# Patient Record
Sex: FEMALE | Race: WHITE | Employment: OTHER | URBAN - METROPOLITAN AREA
[De-identification: names, ages, dates, MRNs, and addresses within clinical notes are randomized per-mention and may not be internally consistent; named-entity substitution may affect disease eponyms.]

---

## 2017-01-25 NOTE — TELEPHONE ENCOUNTER
Metoprolol      Last Written Prescription Date: 9/27/2016  Last Fill Quantity: 30, # refills: 1  Last Office Visit with G, P or [facility] prescribing provider: 5/27/2016       POTASSIUM   Date Value Ref Range Status   05/24/2016 3.4 3.4 - 5.3 mmol/L Final     CREATININE   Date Value Ref Range Status   05/24/2016 0.63 0.52 - 1.04 mg/dL Final     BP Readings from Last 3 Encounters:   06/15/16 126/70   06/08/16 172/68   06/01/16 150/70

## 2017-01-26 RX ORDER — METOPROLOL SUCCINATE 50 MG/1
50 TABLET, EXTENDED RELEASE ORAL DAILY
Qty: 30 TABLET | Refills: 4 | Status: SHIPPED | OUTPATIENT
Start: 2017-01-26 | End: 2017-11-21

## 2017-01-26 NOTE — TELEPHONE ENCOUNTER
Prescription approved per [facility] Refill Protocol.    Anny Sandoval RN

## 2017-08-03 DIAGNOSIS — K21.9 GASTROESOPHAGEAL REFLUX DISEASE WITHOUT ESOPHAGITIS: ICD-10-CM

## 2017-08-03 NOTE — TELEPHONE ENCOUNTER
omeprazole      Last Written Prescription Date: 6/22/16  Last Fill Quantity: 30,  # refills: 11   Last Office Visit with G, P or [facility] prescribing provider: 5/27/16

## 2017-08-03 NOTE — TELEPHONE ENCOUNTER
Routing refill request to provider for review/approval because:  A break in medication  Patient needs to be seen because it has been more than 1 year since last office visit.  Gregoria Josue RN

## 2017-11-21 ENCOUNTER — OFFICE VISIT (OUTPATIENT)
Dept: INTERNAL MEDICINE | Facility: CLINIC | Age: 72
End: 2017-11-21
Payer: COMMERCIAL

## 2017-11-21 VITALS
HEART RATE: 92 BPM | HEIGHT: 62 IN | RESPIRATION RATE: 16 BRPM | OXYGEN SATURATION: 98 % | DIASTOLIC BLOOD PRESSURE: 64 MMHG | WEIGHT: 147 LBS | BODY MASS INDEX: 27.05 KG/M2 | TEMPERATURE: 98.2 F | SYSTOLIC BLOOD PRESSURE: 127 MMHG

## 2017-11-21 DIAGNOSIS — K21.9 GASTROESOPHAGEAL REFLUX DISEASE WITHOUT ESOPHAGITIS: ICD-10-CM

## 2017-11-21 DIAGNOSIS — G89.29 CHRONIC PAIN OF BOTH KNEES: Primary | ICD-10-CM

## 2017-11-21 DIAGNOSIS — M25.561 CHRONIC PAIN OF BOTH KNEES: Primary | ICD-10-CM

## 2017-11-21 DIAGNOSIS — Z23 NEED FOR PNEUMOCOCCAL VACCINATION: ICD-10-CM

## 2017-11-21 DIAGNOSIS — R03.0 ELEVATED BLOOD PRESSURE READING WITHOUT DIAGNOSIS OF HYPERTENSION: ICD-10-CM

## 2017-11-21 DIAGNOSIS — E78.2 MIXED HYPERLIPIDEMIA: ICD-10-CM

## 2017-11-21 DIAGNOSIS — M25.562 CHRONIC PAIN OF BOTH KNEES: Primary | ICD-10-CM

## 2017-11-21 DIAGNOSIS — S83.419A SPRAIN OF MEDIAL COLLATERAL LIGAMENT OF KNEE, UNSPECIFIED LATERALITY, INITIAL ENCOUNTER: ICD-10-CM

## 2017-11-21 PROCEDURE — 90670 PCV13 VACCINE IM: CPT | Performed by: INTERNAL MEDICINE

## 2017-11-21 PROCEDURE — 99214 OFFICE O/P EST MOD 30 MIN: CPT | Mod: 25 | Performed by: INTERNAL MEDICINE

## 2017-11-21 PROCEDURE — G0009 ADMIN PNEUMOCOCCAL VACCINE: HCPCS | Performed by: INTERNAL MEDICINE

## 2017-11-21 RX ORDER — METOPROLOL SUCCINATE 50 MG/1
50 TABLET, EXTENDED RELEASE ORAL DAILY
Qty: 90 TABLET | Refills: 3 | Status: SHIPPED | OUTPATIENT
Start: 2017-11-21 | End: 2018-12-12

## 2017-11-21 RX ORDER — ATORVASTATIN CALCIUM 20 MG/1
20 TABLET, FILM COATED ORAL DAILY
Qty: 90 TABLET | Refills: 3 | Status: SHIPPED | OUTPATIENT
Start: 2017-11-21 | End: 2018-11-07

## 2017-11-21 ASSESSMENT — PAIN SCALES - GENERAL: PAINLEVEL: NO PAIN (0)

## 2017-11-21 NOTE — NURSING NOTE
"Chief Complaint   Patient presents with     Leg Pain     bilateral leg pain, started in the summer       Initial /84  Pulse 92  Temp 98.2  F (36.8  C) (Temporal)  Resp 16  Ht 5' 2\" (1.575 m)  Wt 147 lb (66.7 kg)  SpO2 98%  BMI 26.89 kg/m2 Estimated body mass index is 26.89 kg/(m^2) as calculated from the following:    Height as of this encounter: 5' 2\" (1.575 m).    Weight as of this encounter: 147 lb (66.7 kg).  Medication Reconciliation: complete    "

## 2017-11-21 NOTE — PROGRESS NOTES
"  SUBJECTIVE:   Jordyn Craig is a 72 year old female who presents to clinic today for the following health issues:    Chief Complaint   Patient presents with     Leg Pain     bilateral leg pain, started in the summer     Started in her left leg which is her \"bad leg\" from old injury.  Sore in the medial knees, worse with sitting too long.  Some pain down the back of her knees.  Also pain with doing stairs.      Stretching and walking has helped her pains.  No known injury.  Tried alleve, tylenol, heat, no real difference.      BP is ok at home was 127/64    Past Medical History:   Diagnosis Date     Anxiety      Migraine      Current Outpatient Prescriptions   Medication     omeprazole (PRILOSEC) 20 MG CR capsule     metoprolol (TOPROL-XL) 50 MG 24 hr tablet     acetaminophen (TYLENOL 8 HOUR) 650 MG CR tablet     aspirin 81 MG tablet     No current facility-administered medications for this visit.      Social History   Substance Use Topics     Smoking status: Never Smoker     Smokeless tobacco: Not on file     Alcohol use No     Review of Systems  Constitutional-No fevers, chills, or weight changes..  Cardiac-No chest pain or palpitations.  Respiratory-No cough, sob, or hemoptysis.  GI-reflux without the omeprazole.  Musculoskeletal-bilateral inner knee pain.      Physical Exam  /84  Pulse 92  Temp 98.2  F (36.8  C) (Temporal)  Resp 16  Ht 5' 2\" (1.575 m)  Wt 147 lb (66.7 kg)  SpO2 98%  BMI 26.89 kg/m2  General Appearance-healthy, alert, no distress  Cardiac-regular rate and rhythm  with normal S1, S2 ; no murmur, rub or gallops  Lungs-clear to auscultation  Musculoskeletal-bilateral knees are without swelling, no clicking, no jointline tenderness  Mild pain on MCL testing.    ASSESSMENT:  Patient is here for bilateral knee pain, pain is on the medial aspect of the knee worse with doing stairs, walking. Patient slightly better with stretching and exercise. We discussed exercises to help the medial " aspect of the quadriceps. Recommended an elliptical and specifically leg lifts. She will follow up if not getting better.    Hyperlipidemia-patient has been off her statin and she wasn't sure she needed to continue it, did recommend she stay on the statin therapy and we have refilled this for her.    GERD-intact. Side effects of a proton pump inhibitor and she will try a H2 blocker instead.    Elevated blood pressure-patient does well at home with blood pressure 127/68. She has some whitecoat hypertension. We will continue her on the metoprolol.    Electronically signed by Joe Geller MD

## 2017-11-21 NOTE — PROGRESS NOTES
Screening Questionnaire for Adult Immunization    Are you sick today?   No   Do you have allergies to medications, food, a vaccine component or latex?   Yes   Have you ever had a serious reaction after receiving a vaccination?   No   Do you have a long-term health problem with heart disease, lung disease, asthma, kidney disease, metabolic disease (e.g. diabetes), anemia, or other blood disorder?   No   Do you have cancer, leukemia, HIV/AIDS, or any other immune system problem?   No   In the past 3 months, have you taken medications that affect  your immune system, such as prednisone, other steroids, or anticancer drugs; drugs for the treatment of rheumatoid arthritis, Crohn s disease, or psoriasis; or have you had radiation treatments?   No   Have you had a seizure, or a brain or other nervous system problem?   No   During the past year, have you received a transfusion of blood or blood     products, or been given immune (gamma) globulin or antiviral drug?   No   For women: Are you pregnant or is there a chance you could become        pregnant during the next month?   No   Have you received any vaccinations in the past 4 weeks?   No     Immunization questionnaire was positive for at least one answer.  Notified yes.        Per orders of Dr. Joe Geller, injection of prevnar given by Mariangel Corrigan. Patient instructed to remain in clinic for 15 minutes afterwards, and to report any adverse reaction to me immediately.       Screening performed by Mariangel Corrigan on 11/21/2017 at 9:40 AM.

## 2017-11-21 NOTE — MR AVS SNAPSHOT
"              After Visit Summary   2017    Jordyn Craig    MRN: [id number]           Patient Information     Date Of Birth          1945        Visit Information        Provider Department      2017 9:00 AM Joe Geller MD [facility]         Follow-ups after your visit        Who to contact     If you have questions or need follow up information about today's clinic visit or your schedule please contact [facility] directly at [phone].  Normal or non-critical lab and imaging results will be communicated to you by MyChart, letter or phone within 4 business days after the clinic has received the results. If you do not hear from us within 7 days, please contact the clinic through [facility] or phone. If you have a critical or abnormal lab result, we will notify you by phone as soon as possible.  Submit refill requests through [facility] or call your pharmacy and they will forward the refill request to us. Please allow 3 business days for your refill to be completed.          Additional Information About Your Visit        [facility] Information     [facility] lets you send messages to your doctor, view your test results, renew your prescriptions, schedule appointments and more. To sign up, go to [email or web address] . Click on \"Log in\" on the left side of the screen, which will take you to the Welcome page. Then click on \"Sign up Now\" on the right side of the page.     You will be asked to enter the access code listed below, as well as some personal information. Please follow the directions to create your username and password.     Your access code is: [email or web address]  Expires: 2018  9:08 AM     Your access code will  in 90 days. If you need help or a new code, please call your [facility] or [phone].        Care EveryWhere ID     This is your Care EveryWhere ID. This could be used by other organizations to access your [place] medical " "records  [id number]        Your Vitals Were     Pulse Temperature Respirations Height Pulse Oximetry BMI (Body Mass Index)    92 98.2  F (36.8  C) (Temporal) 16 5' 2\" (1.575 m) 98% 26.89 kg/m2       Blood Pressure from Last 3 Encounters:   11/21/17 164/84   06/15/16 126/70   06/08/16 172/68    Weight from Last 3 Encounters:   11/21/17 147 lb (66.7 kg)   05/27/16 144 lb (65.3 kg)   05/24/16 150 lb (68 kg)              Today, you had the following     No orders found for display         Today's Medication Changes          These changes are accurate as of: 11/21/17  9:08 AM.  If you have any questions, ask your nurse or doctor.               Stop taking these medicines if you haven't already. Please contact your care team if you have questions.     ADVIL MIGRAINE 200 MG capsule   Generic drug:  ibuprofen   Stopped by:  Joe Geller MD                    Primary Care Provider Office Phone # Fax #    Joe Geller [phone]       [facility] [address] [place] [address]        Equal Access to Services     [facility] AH: Hadii aad ku hadasho Soomaali, waaxda luqadaha, qaybta kaalmada adeegyada, waxay marcyin hayaan genna mcgarry lajessen ah. So [facility] [phone].    ATENCIÓN: Si habla español, tiene a ott disposición servicios gratuitos de asistencia lingüística. Llame al [phone].    We comply with applicable federal civil rights laws and [place] laws. We do not discriminate on the basis of race, color, national origin, age, disability, sex, sexual orientation, or gender identity.            Thank you!     Thank you for choosing [facility]  for your care. Our goal is always to provide you with excellent care. Hearing back from our patients is one way we can continue to improve our services. Please take a few minutes to complete the written survey that you may receive in the mail after your visit with us. Thank you!             Your Updated Medication List - " Protect others around you: Learn how to safely use, store and throw away your medicines at www.disposemymeds.org.          This list is accurate as of: 11/21/17  9:08 AM.  Always use your most recent med list.                   Brand Name Dispense Instructions for use Diagnosis    aspirin 81 MG tablet      Take  by mouth daily.        metoprolol 50 MG 24 hr tablet    TOPROL-XL    30 tablet    Take 1 tablet (50 mg) by mouth daily    Elevated blood pressure       omeprazole 20 MG CR capsule    priLOSEC    30 capsule    Take 1 capsule (20 mg) by mouth every evening    Gastroesophageal reflux disease without esophagitis       TYLENOL 8 HOUR 650 MG CR tablet   Generic drug:  acetaminophen     60 tablet    Take 2 tablets (1,300 mg) by mouth every 8 hours as needed For pain

## 2018-07-31 ENCOUNTER — TRANSFERRED RECORDS (OUTPATIENT)
Dept: HEALTH INFORMATION MANAGEMENT | Facility: CLINIC | Age: 73
End: 2018-07-31

## 2018-11-07 DIAGNOSIS — K21.9 GASTROESOPHAGEAL REFLUX DISEASE WITHOUT ESOPHAGITIS: ICD-10-CM

## 2018-11-07 DIAGNOSIS — E78.2 MIXED HYPERLIPIDEMIA: ICD-10-CM

## 2018-11-09 RX ORDER — ATORVASTATIN CALCIUM 20 MG/1
TABLET, FILM COATED ORAL
Qty: 30 TABLET | Refills: 0 | Status: SHIPPED | OUTPATIENT
Start: 2018-11-09 | End: 2018-12-12

## 2018-11-09 NOTE — TELEPHONE ENCOUNTER
"ranitide  Last Written Prescription Date:  11/21/2017  Last Fill Quantity: 180,  # refills: 3   Last office visit: 11/21/2017 with prescribing provider:      Future Office Visit:      Requested Prescriptions   Pending Prescriptions Disp Refills     ranitidine (ZANTAC) 150 MG tablet [Pharmacy Med Name: RANITIDINE HCL 150MG TABS] 180 tablet 2     Sig: TAKE ONE TABLET BY MOUTH TWICE A DAY    H2 Blockers Protocol Passed    11/7/2018  1:33 PM       Passed - Patient is age 12 or older       Passed - Recent (12 mo) or future (30 days) visit within the authorizing provider's specialty    Patient had office visit in the last 12 months or has a visit in the next 30 days with authorizing provider or within the authorizing provider's specialty.  See \"Patient Info\" tab in inbasket, or \"Choose Columns\" in Meds & Orders section of the refill encounter.         Medication is being filled for 1 time refill only due to:  Patient needs to be seen because it has been more than one year since last visit.  Sending to scheduling for outreach.          Atorvastatin  Last Written Prescription Date:  11/21/2017  Last Fill Quantity: 90,  # refills: 3   Last office visit: 11/21/2017 with prescribing provider:      Future Office Visit:         atorvastatin (LIPITOR) 20 MG tablet [Pharmacy Med Name: ATORVASTATIN 20MG TABS] 90 tablet 2     Sig: TAKE ONE TABLET BY MOUTH ONCE DAILY    Statins Protocol Failed    11/7/2018  1:33 PM       Failed - LDL on file in past 12 months    Recent Labs   Lab Test  11/04/16   0756   LDL  165*            Passed - No abnormal creatine kinase in past 12 months    No lab results found.            Passed - Recent (12 mo) or future (30 days) visit within the authorizing provider's specialty    Patient had office visit in the last 12 months or has a visit in the next 30 days with authorizing provider or within the authorizing provider's specialty.  See \"Patient Info\" tab in inbasket, or \"Choose Columns\" in Meds & Orders " section of the refill encounter.             Passed - Patient is age 18 or older       Passed - No active pregnancy on record       Passed - No positive pregnancy test in past 12 months          Medication is being filled for 1 time refill only due to:  Patient needs to be seen because it has been more than one year since last visit. Sending to scheduling for outreach-NEEDS FASTING LABS        Tanika Corrigan RN on 11/9/2018 at 2:40 PM

## 2018-12-12 ENCOUNTER — OFFICE VISIT (OUTPATIENT)
Dept: INTERNAL MEDICINE | Facility: CLINIC | Age: 73
End: 2018-12-12
Payer: COMMERCIAL

## 2018-12-12 VITALS
TEMPERATURE: 98.7 F | WEIGHT: 145 LBS | SYSTOLIC BLOOD PRESSURE: 130 MMHG | OXYGEN SATURATION: 98 % | HEIGHT: 62 IN | RESPIRATION RATE: 16 BRPM | HEART RATE: 84 BPM | BODY MASS INDEX: 26.68 KG/M2 | DIASTOLIC BLOOD PRESSURE: 68 MMHG

## 2018-12-12 DIAGNOSIS — Z12.11 SPECIAL SCREENING FOR MALIGNANT NEOPLASMS, COLON: ICD-10-CM

## 2018-12-12 DIAGNOSIS — R03.0 ELEVATED BLOOD PRESSURE READING WITHOUT DIAGNOSIS OF HYPERTENSION: ICD-10-CM

## 2018-12-12 DIAGNOSIS — E78.2 MIXED HYPERLIPIDEMIA: Primary | ICD-10-CM

## 2018-12-12 DIAGNOSIS — K21.9 GASTROESOPHAGEAL REFLUX DISEASE WITHOUT ESOPHAGITIS: ICD-10-CM

## 2018-12-12 DIAGNOSIS — Z23 NEED FOR PNEUMOCOCCAL VACCINATION: ICD-10-CM

## 2018-12-12 LAB
ALBUMIN SERPL-MCNC: 4.3 G/DL (ref 3.4–5)
ALP SERPL-CCNC: 73 U/L (ref 40–150)
ALT SERPL W P-5'-P-CCNC: 25 U/L (ref 0–50)
ANION GAP SERPL CALCULATED.3IONS-SCNC: 8 MMOL/L (ref 3–14)
AST SERPL W P-5'-P-CCNC: 17 U/L (ref 0–45)
BILIRUB SERPL-MCNC: 0.8 MG/DL (ref 0.2–1.3)
BUN SERPL-MCNC: 7 MG/DL (ref 7–30)
CALCIUM SERPL-MCNC: 9.6 MG/DL (ref 8.5–10.1)
CHLORIDE SERPL-SCNC: 105 MMOL/L (ref 94–109)
CHOLEST SERPL-MCNC: 183 MG/DL
CO2 SERPL-SCNC: 26 MMOL/L (ref 20–32)
CREAT SERPL-MCNC: 0.66 MG/DL (ref 0.52–1.04)
GFR SERPL CREATININE-BSD FRML MDRD: 87 ML/MIN/1.7M2
GLUCOSE SERPL-MCNC: 110 MG/DL (ref 70–99)
HDLC SERPL-MCNC: 71 MG/DL
LDLC SERPL CALC-MCNC: 93 MG/DL
NONHDLC SERPL-MCNC: 112 MG/DL
POTASSIUM SERPL-SCNC: 4.1 MMOL/L (ref 3.4–5.3)
PROT SERPL-MCNC: 7.8 G/DL (ref 6.8–8.8)
SODIUM SERPL-SCNC: 139 MMOL/L (ref 133–144)
TRIGL SERPL-MCNC: 94 MG/DL

## 2018-12-12 PROCEDURE — 99214 OFFICE O/P EST MOD 30 MIN: CPT | Performed by: INTERNAL MEDICINE

## 2018-12-12 PROCEDURE — G0009 ADMIN PNEUMOCOCCAL VACCINE: HCPCS | Performed by: INTERNAL MEDICINE

## 2018-12-12 PROCEDURE — 80053 COMPREHEN METABOLIC PANEL: CPT | Performed by: INTERNAL MEDICINE

## 2018-12-12 PROCEDURE — 36415 COLL VENOUS BLD VENIPUNCTURE: CPT | Performed by: INTERNAL MEDICINE

## 2018-12-12 PROCEDURE — 80061 LIPID PANEL: CPT | Performed by: INTERNAL MEDICINE

## 2018-12-12 PROCEDURE — 90732 PPSV23 VACC 2 YRS+ SUBQ/IM: CPT | Performed by: INTERNAL MEDICINE

## 2018-12-12 RX ORDER — ATORVASTATIN CALCIUM 20 MG/1
20 TABLET, FILM COATED ORAL DAILY
Qty: 90 TABLET | Refills: 3 | Status: SHIPPED | OUTPATIENT
Start: 2018-12-12 | End: 2019-12-18

## 2018-12-12 RX ORDER — METOPROLOL SUCCINATE 50 MG/1
50 TABLET, EXTENDED RELEASE ORAL DAILY
Qty: 90 TABLET | Refills: 3 | Status: SHIPPED | OUTPATIENT
Start: 2018-12-12 | End: 2020-01-14

## 2018-12-12 ASSESSMENT — PAIN SCALES - GENERAL: PAINLEVEL: NO PAIN (0)

## 2018-12-12 ASSESSMENT — MIFFLIN-ST. JEOR: SCORE: 1115.97

## 2018-12-12 NOTE — NURSING NOTE
Prior to injection, verified patient identity using patient's name and date of birth.  Due to injection administration, patient instructed to remain in clinic for 15 minutes  afterwards, and to report any adverse reaction to me immediately.

## 2018-12-12 NOTE — PROGRESS NOTES
"Jordyn Craig is a 73 year old female who presents with recheck of things.  She is doing well.      On ranitidine still needs antacids, was on PPI before.        Past Medical History:   Diagnosis Date     Anxiety      Migraine      White coat syndrome with high blood pressure without hypertension      Current Outpatient Medications   Medication     acetaminophen (TYLENOL 8 HOUR) 650 MG CR tablet     aspirin 81 MG tablet     atorvastatin (LIPITOR) 20 MG tablet     metoprolol (TOPROL-XL) 50 MG 24 hr tablet     ranitidine (ZANTAC) 150 MG tablet     No current facility-administered medications for this visit.      Social History     Tobacco Use     Smoking status: Never Smoker     Smokeless tobacco: Never Used   Substance Use Topics     Alcohol use: No     Alcohol/week: 0.0 oz     Drug use: No     Review of Systems  Constitutional-No fevers, chills, or weight changes..  ENT-No earpain, sore throat, voice changes or rhinitis.  Cardiac-No chest pain or palpitations.  Respiratory-No cough, sob, or hemoptysis.  GI-No nausea, vomitting, diarrhea, constipation, or blood in the stool.    Physical Exam  /68   Pulse 84   Temp 98.7  F (37.1  C) (Temporal)   Resp 16   Ht 1.575 m (5' 2\")   Wt 65.8 kg (145 lb)   SpO2 98%   BMI 26.52 kg/m    General Appearance-healthy, alert, no distress  Cardiac-regular rate and rhythm  with normal S1, S2 ; no murmur, rub or gallops  Lungs-clear to auscultation  Extremities-no peripheral edema, peripheral pulses normal    ASSESSMENT:  This is a 73-year-old woman who comes in for a yearly recheck, she does have hyperlipidemia and is on atorvastatin.  She is fasting today we will check a fasting lipid panel and her liver function test today.    Patient also has slightly elevated blood pressures on Toprol and blood pressure is 130/68 will refill her Toprol for the next year.    Gastroesophageal reflux disease we discussed her symptoms she still taking antacids.  We will change her from " ranitidine back to a proton pump inhibitor and hopefully get better coverage.    Recommended she do a fit test and order that today, also recommend a mammogram which she says she will schedule.  We will give her a pneumonia shot today.    Electronically signed by Joe Geller MD

## 2019-01-13 PROCEDURE — 82274 ASSAY TEST FOR BLOOD FECAL: CPT | Performed by: INTERNAL MEDICINE

## 2019-01-16 ENCOUNTER — TELEPHONE (OUTPATIENT)
Dept: INTERNAL MEDICINE | Facility: CLINIC | Age: 74
End: 2019-01-16

## 2019-01-16 DIAGNOSIS — Z12.11 SPECIAL SCREENING FOR MALIGNANT NEOPLASMS, COLON: ICD-10-CM

## 2019-01-16 LAB — HEMOCCULT STL QL IA: NEGATIVE

## 2019-01-16 NOTE — TELEPHONE ENCOUNTER
----- Message from Joe Geller MD sent at 1/16/2019 12:46 PM CST -----  Stool test was negative which is good.

## 2019-01-16 NOTE — LETTER
January 17, 2019      Jordyn TELLES Rafa  [address]  [place] [address]        Dear ,    We are writing to inform you of your test results.    Stool test was negative which is good.    Resulted Orders   Fecal colorectal cancer screen (FIT)   Result Value Ref Range    Occult Blood Scn FIT Negative NEG^Negative       If you have any questions or concerns, please call the clinic at the number listed above.       Sincerely,        Joe Geller MD

## 2019-03-12 ENCOUNTER — TELEPHONE (OUTPATIENT)
Dept: INTERNAL MEDICINE | Facility: CLINIC | Age: 74
End: 2019-03-12

## 2019-03-12 DIAGNOSIS — K21.9 GASTROESOPHAGEAL REFLUX DISEASE WITHOUT ESOPHAGITIS: Primary | ICD-10-CM

## 2019-03-12 RX ORDER — PANTOPRAZOLE SODIUM 40 MG/1
40 TABLET, DELAYED RELEASE ORAL DAILY
Qty: 30 TABLET | Refills: 1 | Status: SHIPPED | OUTPATIENT
Start: 2019-03-12 | End: 2020-01-14

## 2019-03-12 NOTE — TELEPHONE ENCOUNTER
Pt was advised of the recommendations, pt verbalized understanding. Rx sent already and pt was scheduled.

## 2019-03-12 NOTE — TELEPHONE ENCOUNTER
Reason for Call:  Other call back    Detailed comments: Pt stating the med for reflex,omeprazole (PRILOSEC) 20 MG DR capsule  is not working anymore. The reflex is bad and is constant, wondering if you can prescribe something else for her? Please call and advise.     Phone Number Patient can be reached at: Home number on file [phone] (home)    Best Time: anytime    Can we leave a detailed message on this number? YES    Call taken on 3/12/2019 at 10:02 AM by Staci Davis

## 2019-03-19 NOTE — TELEPHONE ENCOUNTER
Patient is calling back. She did try the Protonix and it is not helping. Her reflux has gotten much worse. She is having a lot of pain. Would like to get worked in sooner then her upcoming appointment in April. Can she be worked in sometime this week?  Thank you,  Adriana Green   for [facility]

## 2019-03-22 ENCOUNTER — OFFICE VISIT (OUTPATIENT)
Dept: FAMILY MEDICINE | Facility: CLINIC | Age: 74
End: 2019-03-22
Payer: COMMERCIAL

## 2019-03-22 VITALS
RESPIRATION RATE: 14 BRPM | HEIGHT: 62 IN | DIASTOLIC BLOOD PRESSURE: 74 MMHG | SYSTOLIC BLOOD PRESSURE: 136 MMHG | TEMPERATURE: 97.3 F | HEART RATE: 75 BPM | WEIGHT: 141.1 LBS | BODY MASS INDEX: 25.96 KG/M2 | OXYGEN SATURATION: 98 %

## 2019-03-22 DIAGNOSIS — K21.9 GASTROESOPHAGEAL REFLUX DISEASE WITHOUT ESOPHAGITIS: ICD-10-CM

## 2019-03-22 DIAGNOSIS — Z12.39 SCREENING BREAST EXAMINATION: ICD-10-CM

## 2019-03-22 DIAGNOSIS — R09.A2 GLOBUS SENSATION: Primary | ICD-10-CM

## 2019-03-22 LAB — TSH SERPL DL<=0.005 MIU/L-ACNC: 1.65 MU/L (ref 0.4–4)

## 2019-03-22 PROCEDURE — 84443 ASSAY THYROID STIM HORMONE: CPT | Performed by: FAMILY MEDICINE

## 2019-03-22 PROCEDURE — 36415 COLL VENOUS BLD VENIPUNCTURE: CPT | Performed by: FAMILY MEDICINE

## 2019-03-22 PROCEDURE — 99214 OFFICE O/P EST MOD 30 MIN: CPT | Performed by: FAMILY MEDICINE

## 2019-03-22 RX ORDER — ALPRAZOLAM 0.5 MG
0.5 TABLET ORAL
Qty: 20 TABLET | Refills: 0 | Status: SHIPPED | OUTPATIENT
Start: 2019-03-22 | End: 2020-01-14

## 2019-03-22 RX ORDER — METHYLPREDNISOLONE 4 MG
TABLET, DOSE PACK ORAL
Qty: 21 TABLET | Refills: 0 | Status: SHIPPED | OUTPATIENT
Start: 2019-03-22 | End: 2019-03-27

## 2019-03-22 ASSESSMENT — MIFFLIN-ST. JEOR: SCORE: 1104.66

## 2019-03-22 NOTE — LETTER
March 22, 2019      Jordyn Craig  [address]  [place] [address]        Dear ,    We are writing to inform you of your test results.    Thyroid test normal      Resulted Orders   TSH with free T4 reflex   Result Value Ref Range    TSH 1.65 0.40 - 4.00 mU/L       If you have any questions or concerns, please call the clinic at the number listed above.       Sincerely,        Matt Corrigan MD

## 2019-03-25 ENCOUNTER — HOSPITAL ENCOUNTER (OUTPATIENT)
Dept: CT IMAGING | Facility: CLINIC | Age: 74
Discharge: HOME | End: 2019-03-25
Attending: FAMILY MEDICINE | Admitting: FAMILY MEDICINE
Payer: COMMERCIAL

## 2019-03-25 ENCOUNTER — TELEPHONE (OUTPATIENT)
Dept: INTERNAL MEDICINE | Facility: CLINIC | Age: 74
End: 2019-03-25

## 2019-03-25 DIAGNOSIS — R09.A2 GLOBUS SENSATION: ICD-10-CM

## 2019-03-25 LAB
CREAT BLD-MCNC: 0.7 MG/DL (ref 0.52–1.04)
GFR SERPL CREATININE-BSD FRML MDRD: 82 ML/MIN/{1.73_M2}

## 2019-03-25 PROCEDURE — 82565 ASSAY OF CREATININE: CPT

## 2019-03-25 PROCEDURE — 70491 CT SOFT TISSUE NECK W/DYE: CPT

## 2019-03-25 PROCEDURE — 25000128 H RX IP 250 OP 636: Performed by: RADIOLOGY

## 2019-03-25 PROCEDURE — 25000125 ZZHC RX 250: Performed by: RADIOLOGY

## 2019-03-25 RX ORDER — IOPAMIDOL 755 MG/ML
500 INJECTION, SOLUTION INTRAVASCULAR ONCE
Status: COMPLETED | OUTPATIENT
Start: 2019-03-25 | End: 2019-03-25

## 2019-03-25 RX ADMIN — IOPAMIDOL 80 ML: 755 INJECTION, SOLUTION INTRAVENOUS at 11:27

## 2019-03-25 RX ADMIN — SODIUM CHLORIDE 68 ML: 9 INJECTION, SOLUTION INTRAVENOUS at 11:27

## 2019-03-25 NOTE — TELEPHONE ENCOUNTER
Called patient to schedule a colonoscopy. Patient declines to schedule and states she would like to check with her insurance and try some other things. States they will call when they are ready. Phone number provided to the patient.

## 2019-03-26 DIAGNOSIS — R09.A2 GLOBUS SENSATION: ICD-10-CM

## 2019-03-26 NOTE — TELEPHONE ENCOUNTER
Reason for Call:  Medication or medication refill:    Do you use a [facility]?  Name of the pharmacy and phone number for the current request:  [facility] - [phone]    Name of the medication requested: Methyiprednisolone     Other request: Pt states this is working for her and wondering if Dr. Corrigan can prescribe more? He prescribed it for her. It's helping with the pain. She is almost out of it.     Can we leave a detailed message on this number? YES    Phone number patient can be reached at: Home number on file [phone] (home)    Best Time: anytime    Call taken on 3/26/2019 at 10:38 AM by Staci Davis

## 2019-03-27 RX ORDER — METHYLPREDNISOLONE 4 MG
TABLET, DOSE PACK ORAL
Qty: 21 TABLET | Refills: 0 | Status: SHIPPED | OUTPATIENT
Start: 2019-03-27 | End: 2019-04-09

## 2019-04-09 ENCOUNTER — OFFICE VISIT (OUTPATIENT)
Dept: INTERNAL MEDICINE | Facility: CLINIC | Age: 74
End: 2019-04-09
Payer: COMMERCIAL

## 2019-04-09 VITALS
HEART RATE: 90 BPM | BODY MASS INDEX: 25.64 KG/M2 | RESPIRATION RATE: 16 BRPM | SYSTOLIC BLOOD PRESSURE: 130 MMHG | DIASTOLIC BLOOD PRESSURE: 82 MMHG | OXYGEN SATURATION: 96 % | WEIGHT: 142 LBS | TEMPERATURE: 98.1 F

## 2019-04-09 DIAGNOSIS — R09.A2 GLOBUS SENSATION: ICD-10-CM

## 2019-04-09 DIAGNOSIS — M24.20 EAGLE'S SYNDROME: Primary | ICD-10-CM

## 2019-04-09 PROCEDURE — 99213 OFFICE O/P EST LOW 20 MIN: CPT | Performed by: INTERNAL MEDICINE

## 2019-04-09 ASSESSMENT — PAIN SCALES - GENERAL: PAINLEVEL: NO PAIN (0)

## 2019-04-09 NOTE — PROGRESS NOTES
SUBJECTIVE:   Jordyn Craig is a 73 year old female who presents to clinic today for the following   health issues:    Chief Complaint   Patient presents with     Gastrophageal Reflux     follow up      She feels some swelling sensation in her throat right in the middle. Been a few months.  Happened a few years ago and helped with medications.      She saw Dr Corrigan, tried a medrol dose pack and seemed to help.  Had a neck Ct done.     No change with PPI.    No neck trauma    Past Medical History:   Diagnosis Date     Anxiety      Migraine      White coat syndrome with high blood pressure without hypertension      Current Outpatient Medications   Medication     acetaminophen (TYLENOL 8 HOUR) 650 MG CR tablet     atorvastatin (LIPITOR) 20 MG tablet     metoprolol succinate ER (TOPROL-XL) 50 MG 24 hr tablet     pantoprazole (PROTONIX) 40 MG EC tablet     ALPRAZolam (XANAX) 0.5 MG tablet     No current facility-administered medications for this visit.      Social History     Tobacco Use     Smoking status: Never Smoker     Smokeless tobacco: Never Used   Substance Use Topics     Alcohol use: No     Alcohol/week: 0.0 oz     Drug use: No     Physical Exam  /78   Pulse 90   Temp 98.1  F (36.7  C) (Temporal)   Resp 16   Wt 64.4 kg (142 lb)   SpO2 96%   BMI 25.64 kg/m    General Appearance-healthy, alert, no distress  Neck is supple, oral pharynx clear       ASSESSMENT:  This is a 73-year-old woman has had a abnormal or globus sensation in her throat, she originally had this in 2016 was actually in the emergency room in the hospital and treated for reflux.  I saw her a few months ago and increased her from a H2 blocker to a PPI but this made no difference.  She saw my partner Dr. Corrigan who did get a neck CT scan and treated her with a Medrol Dosepak.  She got a little bit better with the Dosepak.  The CT scan showed an elongated styloid process on the right side 4 cm on the upper limit of normal is 3 cm.   Since she is having symptoms she could have a [place] syndrome.  I am we discussed this but she not having facial pain, she is not having other neurological symptoms, she not having headaches but the abnormal sensation I would like her to see ENT to see if there is anything that needs to be done.  She has no history of a tonsillectomy or trauma to her neck which could have caused this enlargement of the styloid process.  He can return to ranitidine instead of Protonix as there is no change in her symptoms.

## 2019-05-01 NOTE — PROGRESS NOTES
ENT Consultation    Jordyn Craig is a 73 year old female who is seen in consultation at the request of Dr. Geller.      History of Present Illness - Jordyn Craig is a 73 year old female swelling sensation in her throat right in the middle    Patient presents with a couple year history of globus sensation foreign body sensation this in the sternal notch area in the midline.  She was treated for reflux initially with Protonix then switched to ranitidine initially to me felt like it made a difference but then the problems were back.  She tried Medrol Dosepak that seems to have helped.  She denies any dyspnea dysphagia any voice changes.  Even CT scan of the soft tissues of the neck was done.  The results are as follows.  CT NECK SOFT TISSUES  IMPRESSION:     1. The right styloid process is mildly elongated. This is usually an  incidental finding. 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  2. Degenerative change in the cervical spine.  3. No neck mass is identified. No foreign bodies are seen.      There is no height or weight on file to calculate BMI.    Weight management plan: Patient was referred to their PCP to discuss a diet and exercise plan.    BP Readings from Last 1 Encounters:   04/09/19 130/82       BP noted to be well controlled today in office.     Jordyn IS NOT a smoker/uses chewing tobacco.  Jordyn       Past Medical History -   Past Medical History:   Diagnosis Date     Anxiety      Migraine      White coat syndrome with high blood pressure without hypertension        Current Medications -   Current Outpatient Medications:      acetaminophen (TYLENOL 8 HOUR) 650 MG CR tablet, Take 2 tablets (1,300 mg) by mouth every 8 hours as needed For pain, Disp: 60 tablet, Rfl:      ALPRAZolam (XANAX) 0.5 MG tablet, Take 1 tablet (0.5 mg) by mouth nightly as needed for anxiety (Patient not taking: Reported on 4/9/2019), Disp: 20 tablet, Rfl: 0     atorvastatin (LIPITOR) 20 MG tablet, Take 1 tablet (20  mg) by mouth daily, Disp: 90 tablet, Rfl: 3     metoprolol succinate ER (TOPROL-XL) 50 MG 24 hr tablet, Take 1 tablet (50 mg) by mouth daily, Disp: 90 tablet, Rfl: 3     pantoprazole (PROTONIX) 40 MG EC tablet, Take 1 tablet (40 mg) by mouth daily, Disp: 30 tablet, Rfl: 1    Allergies -   Allergies   Allergen Reactions     Codeine Camsylate Nausea and Vomiting       Social History -   Social History     Socioeconomic History     Marital status:      Spouse name: Not on file     Number of children: Not on file     Years of education: Not on file     Highest education level: Not on file   Occupational History     Not on file   Social Needs     Financial resource strain: Not on file     Food insecurity:     Worry: Not on file     Inability: Not on file     Transportation needs:     Medical: Not on file     Non-medical: Not on file   Tobacco Use     Smoking status: Never Smoker     Smokeless tobacco: Never Used   Substance and Sexual Activity     Alcohol use: No     Alcohol/week: 0.0 oz     Drug use: No     Sexual activity: Not Currently   Lifestyle     Physical activity:     Days per week: Not on file     Minutes per session: Not on file     Stress: Not on file   Relationships     Social connections:     Talks on phone: Not on file     Gets together: Not on file     Attends Restorationism service: Not on file     Active member of club or organization: Not on file     Attends meetings of clubs or organizations: Not on file     Relationship status: Not on file     Intimate partner violence:     Fear of current or ex partner: Not on file     Emotionally abused: Not on file     Physically abused: Not on file     Forced sexual activity: Not on file   Other Topics Concern     Not on file   Social History Narrative     Not on file       Family History -   Family History   Problem Relation Age of Onset     Cerebrovascular Disease Mother      Respiratory Mother         emphasema     Diabetes Father      Cardiovascular Brother       Respiratory Sister      Cardiovascular Brother         MI     Eye Disorder Sister         glaucoma       Review of Systems - As per HPI and PMHx, otherwise review of system review of the head and neck negative. Otherwise 10+ review systems negative.    Physical Exam  There were no vitals taken for this visit.  BMI: There is no height or weight on file to calculate BMI.    General - The patient is well nourished and well developed, and appears to have good nutritional status.  Alert and oriented to person and place, answers questions and cooperates with examination appropriately.    SKIN - No suspicious lesions or rashes.  Respiration - No respiratory distress.  Head and Face - Normocephalic and atraumatic, with no gross asymmetry noted of the contour of the facial features.  The facial nerve is intact, with strong symmetric movements.    Voice and Breathing - The patient was breathing comfortably without the use of accessory muscles. The patients voice was clear and strong, and had appropriate pitch and quality.    Ears - Bilateral pinna and EACs with normal appearing overlying skin. Tympanic membrane intact with good mobility on pneumatic otoscopy bilaterally. Bony landmarks of the ossicular chain are normal. The tympanic membranes are normal in appearance. No retraction, perforation, or masses.  No fluid or purulence was seen in the external canal or the middle ear.     Eyes - Extraocular movements intact.  Sclera were not icteric or injected, conjunctiva were pink and moist.    Mouth - Examination of the oral cavity showed pink, healthy oral mucosa. No lesions or ulcerations noted.  The tongue was mobile and midline, and the dentition were in good condition.      Throat - The walls of the oropharynx were smooth, pink, moist, symmetric, and had no lesions or ulcerations.  The tonsillar pillars and soft palate were symmetric.  The uvula was midline on elevation.    Neck - Normal midline excursion of the  laryngotracheal complex during swallowing.  Full range of motion on passive movement.  Palpation of the occipital, submental, submandibular, internal jugular chain, and supraclavicular nodes did not demonstrate any abnormal lymph nodes or masses.  The carotid pulse was palpable bilaterally.  Palpation of the thyroid was soft and smooth, with no nodules or goiter appreciated.  The trachea was mobile and midline.    Nose - External contour is symmetric, no gross deflection or scars.  Nasal mucosa is pink and moist with no abnormal mucus.  The septum was midline and non-obstructive, turbinates of normal size and position.  No polyps, masses, or purulence noted on examination.    Neuro - Nonfocal neuro exam is normal, CN 2 through 12 intact, normal gait and muscle tone.      Performed in clinic today:  Attempts at mirror laryngoscopy were not possible due to gag reflex.  Therefore I proceeded with a fiberoptic examination.  First I sprayed both sides of the nose with a mixture of lidocaine and neosynephrine.  I then passed the scope through the nasal cavity.  The nasal cavity was unremarkable.  The nasopharynx was mucosally covered and symmetric.  The Eustachian tube openings were unobstructed.  Going further down I had a clear view of the base of tongue which had normal appearing lingual tonsillar tissue.  The base of tongue was free of lesions, and the vallecula was open.  The epiglottis was smooth and mucosally covered.  The supraglottic larynx was then clearly visualized.  The vocal cords moved smoothly and symmetrically, they were pearly white and no lesions were seen.  The pyriform sinuses were open, and the limited view of the postcricoid region did not show any lesions. Dark Blue - [id number]      A/P - Jordyn Carig is a 73 year old female patient with a globus sensation etiology unknown.  The finding of a elongated styloid process probably is a serendipitous finding is very little to do with the actual  symptomatology.  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  Problems may be due to degenerative disc disease since there is been some findings on CT scan could be neurogenic.  She actually says that the symptoms are slowly getting better.  Therefore considering no findings of reflux related changes certainly we can taper her off her medications as well as implement more of a relaxation exercises massage local heat.  Patient will see us back as needed if the problems persist.      Stephen Bonilla MD

## 2019-05-06 ENCOUNTER — OFFICE VISIT (OUTPATIENT)
Dept: OTOLARYNGOLOGY | Facility: CLINIC | Age: 74
End: 2019-05-06
Payer: COMMERCIAL

## 2019-05-06 VITALS
BODY MASS INDEX: 26.68 KG/M2 | SYSTOLIC BLOOD PRESSURE: 135 MMHG | DIASTOLIC BLOOD PRESSURE: 70 MMHG | WEIGHT: 145 LBS | HEIGHT: 62 IN | TEMPERATURE: 98.5 F

## 2019-05-06 DIAGNOSIS — R09.A2 GLOBUS PHARYNGEUS: Primary | ICD-10-CM

## 2019-05-06 PROCEDURE — 99203 OFFICE O/P NEW LOW 30 MIN: CPT | Mod: 25 | Performed by: OTOLARYNGOLOGY

## 2019-05-06 PROCEDURE — 31575 DIAGNOSTIC LARYNGOSCOPY: CPT | Performed by: OTOLARYNGOLOGY

## 2019-05-06 ASSESSMENT — MIFFLIN-ST. JEOR: SCORE: 1122.32

## 2019-05-06 ASSESSMENT — PAIN SCALES - GENERAL: PAINLEVEL: NO PAIN (0)

## 2019-05-06 NOTE — LETTER
5/6/2019         RE: Jordyn Craig  [address]  [facility] [address]        Dear Colleague,    Thank you for referring your patient, Jordyn Craig, to the [facility]. Please see a copy of my visit note below.    ENT Consultation    Jordyn Craig is a 73 year old female who is seen in consultation at the request of Dr. Geller.      History of Present Illness - Jordyn Craig is a 73 year old female swelling sensation in her throat right in the middle    Patient presents with a couple year history of globus sensation foreign body sensation this in the sternal notch area in the midline.  She was treated for reflux initially with Protonix then switched to ranitidine initially to me felt like it made a difference but then the problems were back.  She tried Medrol Dosepak that seems to have helped.  She denies any dyspnea dysphagia any voice changes.  Even CT scan of the soft tissues of the neck was done.  The results are as follows.  CT NECK SOFT TISSUES  IMPRESSION:     1. The right styloid process is mildly elongated. This is usually an  incidental finding. 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  2. Degenerative change in the cervical spine.  3. No neck mass is identified. No foreign bodies are seen.      There is no height or weight on file to calculate BMI.    Weight management plan: Patient was referred to their PCP to discuss a diet and exercise plan.    BP Readings from Last 1 Encounters:   04/09/19 130/82       BP noted to be well controlled today in office.     Jordyn IS NOT a smoker/uses chewing tobacco.  Jordyn       Past Medical History -   Past Medical History:   Diagnosis Date     Anxiety      Migraine      White coat syndrome with high blood pressure without hypertension        Current Medications -   Current Outpatient Medications:      acetaminophen (TYLENOL 8 HOUR) 650 MG CR tablet, Take 2 tablets (1,300 mg) by mouth every 8 hours as needed For pain, Disp: 60  tablet, Rfl:      ALPRAZolam (XANAX) 0.5 MG tablet, Take 1 tablet (0.5 mg) by mouth nightly as needed for anxiety (Patient not taking: Reported on 4/9/2019), Disp: 20 tablet, Rfl: 0     atorvastatin (LIPITOR) 20 MG tablet, Take 1 tablet (20 mg) by mouth daily, Disp: 90 tablet, Rfl: 3     metoprolol succinate ER (TOPROL-XL) 50 MG 24 hr tablet, Take 1 tablet (50 mg) by mouth daily, Disp: 90 tablet, Rfl: 3     pantoprazole (PROTONIX) 40 MG EC tablet, Take 1 tablet (40 mg) by mouth daily, Disp: 30 tablet, Rfl: 1    Allergies -   Allergies   Allergen Reactions     Codeine Camsylate Nausea and Vomiting       Social History -   Social History     Socioeconomic History     Marital status:      Spouse name: Not on file     Number of children: Not on file     Years of education: Not on file     Highest education level: Not on file   Occupational History     Not on file   Social Needs     Financial resource strain: Not on file     Food insecurity:     Worry: Not on file     Inability: Not on file     Transportation needs:     Medical: Not on file     Non-medical: Not on file   Tobacco Use     Smoking status: Never Smoker     Smokeless tobacco: Never Used   Substance and Sexual Activity     Alcohol use: No     Alcohol/week: 0.0 oz     Drug use: No     Sexual activity: Not Currently   Lifestyle     Physical activity:     Days per week: Not on file     Minutes per session: Not on file     Stress: Not on file   Relationships     Social connections:     Talks on phone: Not on file     Gets together: Not on file     Attends Zoroastrianism service: Not on file     Active member of club or organization: Not on file     Attends meetings of clubs or organizations: Not on file     Relationship status: Not on file     Intimate partner violence:     Fear of current or ex partner: Not on file     Emotionally abused: Not on file     Physically abused: Not on file     Forced sexual activity: Not on file   Other Topics Concern     Not on  file   Social History Narrative     Not on file       Family History -   Family History   Problem Relation Age of Onset     Cerebrovascular Disease Mother      Respiratory Mother         emphasema     Diabetes Father      Cardiovascular Brother      Respiratory Sister      Cardiovascular Brother         MI     Eye Disorder Sister         glaucoma       Review of Systems - As per HPI and PMHx, otherwise review of system review of the head and neck negative. Otherwise 10+ review systems negative.    Physical Exam  There were no vitals taken for this visit.  BMI: There is no height or weight on file to calculate BMI.    General - The patient is well nourished and well developed, and appears to have good nutritional status.  Alert and oriented to person and place, answers questions and cooperates with examination appropriately.    SKIN - No suspicious lesions or rashes.  Respiration - No respiratory distress.  Head and Face - Normocephalic and atraumatic, with no gross asymmetry noted of the contour of the facial features.  The facial nerve is intact, with strong symmetric movements.    Voice and Breathing - The patient was breathing comfortably without the use of accessory muscles. The patients voice was clear and strong, and had appropriate pitch and quality.    Ears - Bilateral pinna and EACs with normal appearing overlying skin. Tympanic membrane intact with good mobility on pneumatic otoscopy bilaterally. Bony landmarks of the ossicular chain are normal. The tympanic membranes are normal in appearance. No retraction, perforation, or masses.  No fluid or purulence was seen in the external canal or the middle ear.     Eyes - Extraocular movements intact.  Sclera were not icteric or injected, conjunctiva were pink and moist.    Mouth - Examination of the oral cavity showed pink, healthy oral mucosa. No lesions or ulcerations noted.  The tongue was mobile and midline, and the dentition were in good condition.       Throat - The walls of the oropharynx were smooth, pink, moist, symmetric, and had no lesions or ulcerations.  The tonsillar pillars and soft palate were symmetric.  The uvula was midline on elevation.    Neck - Normal midline excursion of the laryngotracheal complex during swallowing.  Full range of motion on passive movement.  Palpation of the occipital, submental, submandibular, internal jugular chain, and supraclavicular nodes did not demonstrate any abnormal lymph nodes or masses.  The carotid pulse was palpable bilaterally.  Palpation of the thyroid was soft and smooth, with no nodules or goiter appreciated.  The trachea was mobile and midline.    Nose - External contour is symmetric, no gross deflection or scars.  Nasal mucosa is pink and moist with no abnormal mucus.  The septum was midline and non-obstructive, turbinates of normal size and position.  No polyps, masses, or purulence noted on examination.    Neuro - Nonfocal neuro exam is normal, CN 2 through 12 intact, normal gait and muscle tone.      Performed in clinic today:  Attempts at mirror laryngoscopy were not possible due to gag reflex.  Therefore I proceeded with a fiberoptic examination.  First I sprayed both sides of the nose with a mixture of lidocaine and neosynephrine.  I then passed the scope through the nasal cavity.  The nasal cavity was unremarkable.  The nasopharynx was mucosally covered and symmetric.  The Eustachian tube openings were unobstructed.  Going further down I had a clear view of the base of tongue which had normal appearing lingual tonsillar tissue.  The base of tongue was free of lesions, and the vallecula was open.  The epiglottis was smooth and mucosally covered.  The supraglottic larynx was then clearly visualized.  The vocal cords moved smoothly and symmetrically, they were pearly white and no lesions were seen.  The pyriform sinuses were open, and the limited view of the postcricoid region did not show any lesions.  Dark Blue - [id number]      A/P - Jordyn Craig is a 73 year old female patient with a globus sensation etiology unknown.  The finding of a elongated styloid process probably is a serendipitous finding is very little to do with the actual symptomatology.  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  Problems may be due to degenerative disc disease since there is been some findings on CT scan could be neurogenic.  She actually says that the symptoms are slowly getting better.  Therefore considering no findings of reflux related changes certainly we can taper her off her medications as well as implement more of a relaxation exercises massage local heat.  Patient will see us back as needed if the problems persist.      Stephen Bonilla MD    Again, thank you for allowing me to participate in the care of your patient.        Sincerely,        Stephen Bonilla MD, MD

## 2019-11-19 ENCOUNTER — HOSPITAL ENCOUNTER (OUTPATIENT)
Dept: MAMMOGRAPHY | Facility: CLINIC | Age: 74
Discharge: HOME | End: 2019-11-19
Attending: FAMILY MEDICINE | Admitting: FAMILY MEDICINE
Payer: COMMERCIAL

## 2019-11-19 DIAGNOSIS — Z12.39 SCREENING BREAST EXAMINATION: ICD-10-CM

## 2019-11-19 DIAGNOSIS — Z12.31 VISIT FOR SCREENING MAMMOGRAM: ICD-10-CM

## 2019-11-19 PROCEDURE — 77063 BREAST TOMOSYNTHESIS BI: CPT

## 2019-12-18 DIAGNOSIS — E78.2 MIXED HYPERLIPIDEMIA: ICD-10-CM

## 2019-12-18 RX ORDER — ATORVASTATIN CALCIUM 20 MG/1
20 TABLET, FILM COATED ORAL DAILY
Qty: 90 TABLET | Refills: 3 | Status: SHIPPED | OUTPATIENT
Start: 2019-12-18 | End: 2020-01-14

## 2019-12-18 NOTE — TELEPHONE ENCOUNTER
Patient had it written down that she had an appt with Dr. Geller today but she was not on the schedule.  She rescheduled for January 14 th, 2020 but needs her atorvastatin filled. Rx pending

## 2020-01-14 ENCOUNTER — TELEPHONE (OUTPATIENT)
Dept: INTERNAL MEDICINE | Facility: CLINIC | Age: 75
End: 2020-01-14

## 2020-01-14 ENCOUNTER — OFFICE VISIT (OUTPATIENT)
Dept: INTERNAL MEDICINE | Facility: CLINIC | Age: 75
End: 2020-01-14
Payer: COMMERCIAL

## 2020-01-14 VITALS
SYSTOLIC BLOOD PRESSURE: 162 MMHG | OXYGEN SATURATION: 98 % | RESPIRATION RATE: 16 BRPM | DIASTOLIC BLOOD PRESSURE: 68 MMHG | HEART RATE: 88 BPM | TEMPERATURE: 97 F | HEIGHT: 63 IN | WEIGHT: 145 LBS | BODY MASS INDEX: 25.69 KG/M2

## 2020-01-14 DIAGNOSIS — K21.9 GASTROESOPHAGEAL REFLUX DISEASE WITHOUT ESOPHAGITIS: ICD-10-CM

## 2020-01-14 DIAGNOSIS — Z12.11 SPECIAL SCREENING FOR MALIGNANT NEOPLASMS, COLON: ICD-10-CM

## 2020-01-14 DIAGNOSIS — Z00.00 ENCOUNTER FOR MEDICARE ANNUAL WELLNESS EXAM: Primary | ICD-10-CM

## 2020-01-14 DIAGNOSIS — I10 ESSENTIAL HYPERTENSION: ICD-10-CM

## 2020-01-14 DIAGNOSIS — E78.2 MIXED HYPERLIPIDEMIA: ICD-10-CM

## 2020-01-14 DIAGNOSIS — R03.0 ELEVATED BLOOD PRESSURE READING WITHOUT DIAGNOSIS OF HYPERTENSION: ICD-10-CM

## 2020-01-14 LAB
ALBUMIN SERPL-MCNC: 4.1 G/DL (ref 3.4–5)
ALP SERPL-CCNC: 89 U/L (ref 40–150)
ALT SERPL W P-5'-P-CCNC: 26 U/L (ref 0–50)
ANION GAP SERPL CALCULATED.3IONS-SCNC: 6 MMOL/L (ref 3–14)
AST SERPL W P-5'-P-CCNC: 15 U/L (ref 0–45)
BILIRUB SERPL-MCNC: 0.8 MG/DL (ref 0.2–1.3)
BUN SERPL-MCNC: 11 MG/DL (ref 7–30)
CALCIUM SERPL-MCNC: 9.4 MG/DL (ref 8.5–10.1)
CHLORIDE SERPL-SCNC: 108 MMOL/L (ref 94–109)
CHOLEST SERPL-MCNC: 172 MG/DL
CO2 SERPL-SCNC: 28 MMOL/L (ref 20–32)
CREAT SERPL-MCNC: 0.6 MG/DL (ref 0.52–1.04)
CREAT UR-MCNC: 27 MG/DL
GFR SERPL CREATININE-BSD FRML MDRD: 90 ML/MIN/{1.73_M2}
GLUCOSE SERPL-MCNC: 121 MG/DL (ref 70–99)
HDLC SERPL-MCNC: 76 MG/DL
LDLC SERPL CALC-MCNC: 78 MG/DL
MICROALBUMIN UR-MCNC: 7 MG/L
MICROALBUMIN/CREAT UR: 26.3 MG/G CR (ref 0–25)
NONHDLC SERPL-MCNC: 96 MG/DL
POTASSIUM SERPL-SCNC: 4.1 MMOL/L (ref 3.4–5.3)
PROT SERPL-MCNC: 7.8 G/DL (ref 6.8–8.8)
SODIUM SERPL-SCNC: 142 MMOL/L (ref 133–144)
TRIGL SERPL-MCNC: 92 MG/DL

## 2020-01-14 PROCEDURE — 82043 UR ALBUMIN QUANTITATIVE: CPT | Performed by: INTERNAL MEDICINE

## 2020-01-14 PROCEDURE — 36415 COLL VENOUS BLD VENIPUNCTURE: CPT | Performed by: INTERNAL MEDICINE

## 2020-01-14 PROCEDURE — 80061 LIPID PANEL: CPT | Performed by: INTERNAL MEDICINE

## 2020-01-14 PROCEDURE — 80053 COMPREHEN METABOLIC PANEL: CPT | Performed by: INTERNAL MEDICINE

## 2020-01-14 PROCEDURE — G0438 PPPS, INITIAL VISIT: HCPCS | Performed by: INTERNAL MEDICINE

## 2020-01-14 RX ORDER — PANTOPRAZOLE SODIUM 40 MG/1
40 TABLET, DELAYED RELEASE ORAL DAILY
Qty: 90 TABLET | Refills: 3 | Status: SHIPPED | OUTPATIENT
Start: 2020-01-14 | End: 2020-12-16

## 2020-01-14 RX ORDER — ATORVASTATIN CALCIUM 20 MG/1
20 TABLET, FILM COATED ORAL DAILY
Qty: 90 TABLET | Refills: 3 | Status: SHIPPED | OUTPATIENT
Start: 2020-01-14 | End: 2020-12-16

## 2020-01-14 RX ORDER — METOPROLOL SUCCINATE 50 MG/1
50 TABLET, EXTENDED RELEASE ORAL DAILY
Qty: 90 TABLET | Refills: 3 | Status: SHIPPED | OUTPATIENT
Start: 2020-01-14 | End: 2020-12-16

## 2020-01-14 RX ORDER — AMLODIPINE BESYLATE 5 MG/1
5 TABLET ORAL DAILY
Qty: 30 TABLET | Refills: 3 | Status: SHIPPED | OUTPATIENT
Start: 2020-01-14 | End: 2020-02-07

## 2020-01-14 ASSESSMENT — ENCOUNTER SYMPTOMS
EYE PAIN: 0
COUGH: 0
DIZZINESS: 0
FREQUENCY: 0
HEMATOCHEZIA: 0
CHILLS: 0
HEMATURIA: 0
CONSTIPATION: 0
DIARRHEA: 0
FEVER: 0
ABDOMINAL PAIN: 0
NERVOUS/ANXIOUS: 1

## 2020-01-14 ASSESSMENT — PAIN SCALES - GENERAL: PAINLEVEL: NO PAIN (0)

## 2020-01-14 ASSESSMENT — ACTIVITIES OF DAILY LIVING (ADL): CURRENT_FUNCTION: NO ASSISTANCE NEEDED

## 2020-01-14 ASSESSMENT — MIFFLIN-ST. JEOR: SCORE: 1118.91

## 2020-01-14 NOTE — PROGRESS NOTES
"    The patient was counseled and encouraged to consider modifying their diet and eating habits. She was provided with information on recommended healthy diet options.  The patient was provided with written information regarding signs of hearing loss.  The patient was provided with suggestions to help her develop a healthy emotional lifestyle.  Answers for HPI/ROS submitted by the patient on 1/14/2020   Annual Exam:  In general, how would you rate your overall physical health?: good  Frequency of exercise:: 4-5 days/week  Do you usually eat at least 4 servings of fruit and vegetables a day, include whole grains & fiber, and avoid regularly eating high fat or \"junk\" foods? : No  Taking medications regularly:: Yes  Medication side effects:: None  Activities of Daily Living: no assistance needed  Home safety: no safety concerns identified  Hearing Impairment:: difficult to understand a speaker at a public meeting or [employer] service, need to ask people to speak up or repeat themselves  In the past 6 months, have you been bothered by leaking of urine?: No  abdominal pain: No  Blood in stool: No  Blood in urine: No  chest pain: No  chills: No  congestion: No  constipation: No  cough: No  diarrhea: No  dizziness: No  ear pain: No  eye pain: No  nervous/anxious: Yes  fever: No  frequency: No  In general, how would you rate your overall mental or emotional health?: fair  Additional concerns today:: No  Duration of exercise:: 30-45 minutes    "

## 2020-01-14 NOTE — PROGRESS NOTES
"SUBJECTIVE:   Jordyn Craig is a 74 year old female who presents for Preventive Visit.  Are you in the first 12 months of your Medicare coverage?  No    Healthy Habits:     In general, how would you rate your overall health?  Good    Frequency of exercise:  4-5 days/week    Duration of exercise:  30-45 minutes    Do you usually eat at least 4 servings of fruit and vegetables a day, include whole grains    & fiber and avoid regularly eating high fat or \"junk\" foods?  No    Taking medications regularly:  Yes    Medication side effects:  None    Ability to successfully perform activities of daily living:  No assistance needed    Home Safety:  No safety concerns identified    Hearing Impairment:  Difficult to understand a speaker at a public meeting or Bahai service and need to ask people to speak up or repeat themselves    In the past 6 months, have you been bothered by leaking of urine?  No    In general, how would you rate your overall mental or emotional health?  Fair      PHQ-2 Total Score: 1    Additional concerns today:  No    She is doing ok, taking protonix for heartburn.  It does help her symptoms. Still some globus sensation, saw ENT last year and nothing found.    Stress with her  having melanoma, lots of medical work up.      She walks for exercise,     Do you feel safe in your environment? Yes    Have you ever done Advance Care Planning? (For example, a Health Directive, POLST, or a discussion with a medical provider or your loved ones about your wishes): No, advance care planning information given to patient to review.  Advanced care planning was discussed at today's visit.      Fall risk  Fallen 2 or more times in the past year?: No  Any fall with injury in the past year?: No    Cognitive Screening   1) Repeat 3 items (Leader, Season, Table)    2) Clock draw: NORMAL  3) 3 item recall: Recalls 3 objects  Results: 3 items recalled: COGNITIVE IMPAIRMENT LESS LIKELY    Mini-CogTM Copyright S " Leobardo. Licensed by the author for use in [facility]; reprinted with permission (jose@[email or web address]). All rights reserved.      Do you have sleep apnea, excessive snoring or daytime drowsiness?: no    Reviewed and updated as needed this visit by clinical staff  Tobacco  Allergies  Meds         Reviewed and updated as needed this visit by Provider        Social History     Tobacco Use     Smoking status: Never Smoker     Smokeless tobacco: Never Used   Substance Use Topics     Alcohol use: No     Alcohol/week: 0.0 standard drinks     Alcohol Use 1/14/2020   Prescreen: >3 drinks/day or >7 drinks/week? Not Applicable       Current providers sharing in care for this patient include:   Patient Care Team:  Joe Geller MD as PCP - General (Internal Medicine)  Joe Geller MD as Assigned PCP    The following health maintenance items are reviewed in Epic and correct as of today:  Health Maintenance   Topic Date Due     DEXA  1945     ADVANCE CARE PLANNING  1945     DTAP/TDAP/TD IMMUNIZATION (1 - Tdap) 07/14/1956     ZOSTER IMMUNIZATION (1 of 2) 07/14/1995     MEDICARE ANNUAL WELLNESS VISIT  07/14/2010     PHQ-2  01/01/2020     FIT  01/13/2020     FALL RISK ASSESSMENT  03/22/2020     MAMMO SCREENING  11/19/2021     LIPID  12/12/2023     HEPATITIS C SCREENING  Completed     INFLUENZA VACCINE  Completed     PNEUMOCOCCAL IMMUNIZATION 65+ LOW/MEDIUM RISK  Completed     IPV IMMUNIZATION  Aged Out     MENINGITIS IMMUNIZATION  Aged Out     Lab work is in process  Pneumonia Vaccine:up to date      Review of Systems   Constitutional: Negative for chills and fever.   HENT: Negative for congestion and ear pain.    Eyes: Negative for pain.   Respiratory: Negative for cough.    Cardiovascular: Negative for chest pain.   Gastrointestinal: Negative for abdominal pain, constipation, diarrhea and hematochezia.   Genitourinary: Negative for frequency and hematuria.   Neurological: Negative for dizziness.  "  Psychiatric/Behavioral: The patient is nervous/anxious.        OBJECTIVE:   BP (!) 162/68   Pulse 88   Temp 97  F (36.1  C) (Temporal)   Resp 16   Ht 1.588 m (5' 2.5\")   Wt 65.8 kg (145 lb)   SpO2 98%   BMI 26.10 kg/m   Estimated body mass index is 26.1 kg/m  as calculated from the following:    Height as of this encounter: 1.588 m (5' 2.5\").    Weight as of this encounter: 65.8 kg (145 lb).  Physical Exam  GENERAL: healthy, alert and no distress  EYES: Eyes grossly normal to inspection, PERRL and conjunctivae and sclerae normal  HENT: ear canals and TM's normal, nose and mouth without ulcers or lesions  NECK: no adenopathy, no asymmetry, masses, or scars and thyroid normal to palpation  RESP: lungs clear to auscultation - no rales, rhonchi or wheezes  CV: regular rate and rhythm, normal S1 S2, no S3 or S4, no murmur, click or rub, no peripheral edema and peripheral pulses strong  ABDOMEN: soft, nontender, no hepatosplenomegaly, no masses and bowel sounds normal  MS: no gross musculoskeletal defects noted, no edema  SKIN: no suspicious lesions or rashes  NEURO: Normal strength and tone, mentation intact and speech normal  PSYCH: mentation appears normal, affect normal/bright        ASSESSMENT / PLAN:       ICD-10-CM    1. Encounter for Medicare annual wellness exam Z00.00    2. Mixed hyperlipidemia E78.2 Lipid Profile     Comprehensive metabolic panel     atorvastatin (LIPITOR) 20 MG tablet   3. Gastroesophageal reflux disease without esophagitis K21.9 pantoprazole (PROTONIX) 40 MG EC tablet   4. Essential hypertension I10 Lipid Profile     Comprehensive metabolic panel     Albumin Random Urine Quantitative with Creat Ratio     amLODIPine (NORVASC) 5 MG tablet   5. Special screening for malignant neoplasms, colon Z12.11 Fecal colorectal cancer screen (FIT)   6. Elevated blood pressure reading without diagnosis of hypertension R03.0 metoprolol succinate ER (TOPROL-XL) 50 MG 24 hr tablet     Patient here for " "her annual wellness check.  She is doing okay.  She is having some issues with stress as her  has melanoma.  She is been to a lot of medical visits.  She does get anxious about driving and concerns of the weather in the winter.    Her blood pressure is high will make a formal diagnosis of hypertension today.  Add amlodipine 5 mg to the metoprolol 50 mg a day.  She should recheck her blood pressure with a float nurse in 2 weeks.    Recommended a tetanus pertussis shot at the pharmacy due to better insurance coverage.    We will do labs today.    Do a fit test instead of a colonoscopy.  She is up-to-date on her mammogram.    COUNSELING:  Reviewed preventive health counseling, as reflected in patient instructions       Regular exercise       Healthy diet/nutrition       Colon cancer screening    Estimated body mass index is 26.1 kg/m  as calculated from the following:    Height as of this encounter: 1.588 m (5' 2.5\").    Weight as of this encounter: 65.8 kg (145 lb).         reports that she has never smoked. She has never used smokeless tobacco.      Appropriate preventive services were discussed with this patient, including applicable screening as appropriate for cardiovascular disease, diabetes, osteopenia/osteoporosis, and glaucoma.  As appropriate for age/gender, discussed screening for colorectal cancer, prostate cancer, breast cancer, and cervical cancer. Checklist reviewing preventive services available has been given to the patient.    Reviewed patients plan of care and provided an AVS. The Basic Care Plan (routine screening as documented in Health Maintenance) for Jordyn meets the Care Plan requirement. This Care Plan has been established and reviewed with the Patient.    Counseling Resources:  ATP IV Guidelines  Pooled Cohorts Equation Calculator  Breast Cancer Risk Calculator  FRAX Risk Assessment  ICSI Preventive Guidelines  Dietary Guidelines for Americans, 2010  USDA's MyPlate  ASA Prophylaxis  Lung " CA Screening    Joe Geller MD  [facility]    Identified Health Risks:

## 2020-01-14 NOTE — TELEPHONE ENCOUNTER
The patient called back and information has been given.   Thank you,  Adriana Green   for [facility]

## 2020-01-14 NOTE — TELEPHONE ENCOUNTER
----- Message from Joe Geller MD sent at 1/14/2020  9:35 AM CST -----  Please call and let her know that her labs are good.  She should try the extra medication for blood pressure and recheck her blood pressure in a few weeks.  Cholesterol is better than last year continue as she is doing.  Recheck other labs in a year.

## 2020-01-14 NOTE — PATIENT INSTRUCTIONS
Patient Education   Personalized Prevention Plan  You are due for the preventive services outlined below.  Your care team is available to assist you in scheduling these services.  If you have already completed any of these items, please share that information with your care team to update in your medical record.  Health Maintenance Due   Topic Date Due     Osteoporosis Screening  1945     Discuss Advance Care Planning  1945     Diptheria Tetanus Pertussis (DTAP/TDAP/TD) Vaccine (1 - Tdap) 07/14/1956     Zoster (Shingles) Vaccine (1 of 2) 07/14/1995     Annual Wellness Visit  07/14/2010     PHQ-2  01/01/2020     FIT Test  01/13/2020       Understanding USDA MyPlate  The USDA (U.S. Department of Agriculture) has guidelines to help you make healthy food choices. These are called MyPlate. MyPlate shows the food groups that make up healthy meals using the image of a place setting. Before you eat, think about the healthiest choices for what to put onto your plate or into your cup or bowl. To learn more about building a healthy plate, visit www.choosemyplate.gov.    The food groups    Fruits. Any fruit or 100% fruit juice counts as part of the Fruit Group. Fruits may be fresh, canned, frozen, or dried, and may be whole, cut-up, or pureed. Make half your plate fruits and vegetables.    Vegetables. Any vegetable or 100% vegetable juice counts as a member of the Vegetable Group. Vegetables may be fresh, frozen, canned, or dried. They can be served raw or cooked and may be whole, cut-up, or mashed. Make half your plate fruits and vegetables.    Grains. All foods made from grains are part of the Grains Group. These include wheat, rice, oats, cornmeal, and barley such as bread, pasta, oatmeal, cereal, tortillas, and grits. Grains should be no more than a quarter of your plate. At least half of your grains should be whole grains.    Protein. This group includes meat, poultry, seafood, beans and peas, eggs, processed  soy products (like tofu), nuts (including nut butters), and seeds. Make protein choices no more than a quarter of your plate. Meat and poultry choices should be lean or low fat.    Dairy. All fluid milk products and foods made from milk that contain calcium, like yogurt and cheese, are part of the Dairy Group. (Foods that have little calcium, such as cream, butter, and cream cheese, are not part of the group.) Most dairy choices should be low-fat or fat-free.    Oils. These are fats that are liquid at room temperature. They include canola, corn, olive, soybean, and sunflower oil. Foods that are mainly oil include mayonnaise, certain salad dressings, and soft margarines. You should have only 5 to 7 teaspoons of oils a day. You probably already get this much from the food you eat.  Date Last Reviewed: 8/1/2017 2000-2019 [employer]. [address] [place] [address]. All rights reserved. This information is not intended as a substitute for professional medical care. Always follow your healthcare professional's instructions.          Signs of Hearing Loss     Hearing much better with one ear can be a sign of hearing loss.     Hearing loss is a problem shared by many people. In fact, it is one of the most common health conditions, particularly as people age. Most people over age 65 have some hearing loss, and by age 80, almost everyone does. Because hearing loss usually occurs slowly over the years, you may not realize your hearing ability has gotten worse.  Have your hearing checked  Contact your healthcare provider if you:    Have to strain to hear normal conversation    Have to watch other people s faces very carefully to follow what they re saying    Need to ask people to repeat what they ve said    Often misunderstand what people are saying    Turn the volume of the television or radio up so high that others complain    Feel that people are mumbling when they re talking to you    Find that  the effort to hear leaves you feeling tired and irritated    Notice, when using the phone, that you hear better with one ear than the other  Date Last Reviewed: 12/1/2016 2000-2019 [employer]. [address] [place] [address], [place] [address]. All rights reserved. This information is not intended as a substitute for professional medical care. Always follow your healthcare professional's instructions.        Your Health Risk Assessment indicates you feel you are not in good emotional health.    Recreation   Recreation is not limited to sports and team events. It includes any activity that provides relaxation, interest, enjoyment, and exercise. Recreation provides an outlet for physical, mental, and social energy. It can give a sense of worth and achievement. It can help you stay healthy.    Mental Exercise and Social Involvement  Mental and emotional health is as important as physical health. Keep in touch with friends and family. Stay as active as possible. Continue to learn and challenge yourself.   Things you can do to stay mentally active are:    Learn something new, like a foreign language or musical instrument.     Play SCRABBLE or do crossword puzzles. If you cannot find people to play these games with you at home, you can play them with others on your computer through the Internet.     Join a games club--anything from card games to chess or checkers or lawn bowling.     Start a new hobby.     Go back to school.     Volunteer.     Read.   Keep up with world events.

## 2020-01-14 NOTE — TELEPHONE ENCOUNTER
Tried to reach patient, left message for patient to call the clinic back.    Harvey Pena, [facility]

## 2020-01-17 ENCOUNTER — TELEPHONE (OUTPATIENT)
Dept: INTERNAL MEDICINE | Facility: CLINIC | Age: 75
End: 2020-01-17

## 2020-02-07 ENCOUNTER — ALLIED HEALTH/NURSE VISIT (OUTPATIENT)
Dept: FAMILY MEDICINE | Facility: CLINIC | Age: 75
End: 2020-02-07
Payer: COMMERCIAL

## 2020-02-07 VITALS — DIASTOLIC BLOOD PRESSURE: 60 MMHG | HEART RATE: 68 BPM | SYSTOLIC BLOOD PRESSURE: 170 MMHG

## 2020-02-07 DIAGNOSIS — I10 ESSENTIAL HYPERTENSION: ICD-10-CM

## 2020-02-07 PROCEDURE — 99207 ZZC NO CHARGE NURSE ONLY: CPT

## 2020-02-07 RX ORDER — AMLODIPINE BESYLATE 5 MG/1
10 TABLET ORAL DAILY
Qty: 60 TABLET | Refills: 3 | Status: SHIPPED | OUTPATIENT
Start: 2020-02-07 | End: 2020-08-21

## 2020-02-07 NOTE — PROGRESS NOTES
Jordyn Craig is a 74 year old patient who comes in today for a Blood Pressure check.  Initial BP:  BP (!) 170/60   Pulse 68      68  Disposition: results routed to provider    Per Dr. Geller he would like her to double her amlodipine to 10mg per day, and come in for a BP check in 1 week. Patient was scheduled on for 2/14/20    Sarai Herbert CMA ([facility])

## 2020-02-07 NOTE — TELEPHONE ENCOUNTER
Patient was in for a BP check today, per PCP he would like her to double her Amlodipine to 10 mg.     Medication is pending for refill.     Sarai Herbert CMA ([facility])

## 2020-02-14 ENCOUNTER — ALLIED HEALTH/NURSE VISIT (OUTPATIENT)
Dept: FAMILY MEDICINE | Facility: CLINIC | Age: 75
End: 2020-02-14
Payer: COMMERCIAL

## 2020-02-14 VITALS — DIASTOLIC BLOOD PRESSURE: 63 MMHG | SYSTOLIC BLOOD PRESSURE: 125 MMHG

## 2020-02-14 DIAGNOSIS — Z01.30 BP CHECK: Primary | ICD-10-CM

## 2020-02-14 PROCEDURE — 99207 ZZC NO CHARGE NURSE ONLY: CPT

## 2020-02-14 NOTE — PROGRESS NOTES
Jordyn Craig is a 74 year old patient who comes in today for a Blood Pressure check.  Initial BP:180/78 FINAL: /63      Data Unavailable  Disposition: follow-up as previously indicated by provider and provider notified while patient in the clinic.   Manual readings were high, however home numbers were recorded and well within normal range from the last week. Home monitor was brought in and tested for accuracy, but we were unable to get a reading. Patient advised to take BP at the grocery store later today to see if it is, in fact, the environment causing the spike in numbers.   Amparo Rogers, CMA

## 2020-08-21 DIAGNOSIS — I10 ESSENTIAL HYPERTENSION: ICD-10-CM

## 2020-08-21 RX ORDER — AMLODIPINE BESYLATE 5 MG/1
TABLET ORAL
Qty: 180 TABLET | Refills: 1 | Status: SHIPPED | OUTPATIENT
Start: 2020-08-21 | End: 2020-12-16

## 2020-08-21 NOTE — TELEPHONE ENCOUNTER
Prescription approved per [facility] Refill Protocol.    Ileana Novak RN on 8/21/2020 at 11:43 AM

## 2020-11-13 DIAGNOSIS — K21.9 GASTROESOPHAGEAL REFLUX DISEASE WITHOUT ESOPHAGITIS: ICD-10-CM

## 2020-11-13 RX ORDER — PANTOPRAZOLE SODIUM 40 MG/1
TABLET, DELAYED RELEASE ORAL
Qty: 90 TABLET | Refills: 3 | OUTPATIENT
Start: 2020-11-13

## 2020-11-13 NOTE — TELEPHONE ENCOUNTER
Prescription was sent 4/17/20 for #90 with 3 refills.  Pharmacy notified via E-Prescribe refusal.     Ileana Novak RN on 11/13/2020 at 4:14 PM

## 2020-12-11 ENCOUNTER — MYC MEDICAL ADVICE (OUTPATIENT)
Dept: INTERNAL MEDICINE | Facility: CLINIC | Age: 75
End: 2020-12-11

## 2020-12-16 ENCOUNTER — VIRTUAL VISIT (OUTPATIENT)
Dept: INTERNAL MEDICINE | Facility: CLINIC | Age: 75
End: 2020-12-16
Payer: COMMERCIAL

## 2020-12-16 VITALS — HEART RATE: 87 BPM | DIASTOLIC BLOOD PRESSURE: 58 MMHG | SYSTOLIC BLOOD PRESSURE: 134 MMHG

## 2020-12-16 DIAGNOSIS — K21.9 GASTROESOPHAGEAL REFLUX DISEASE WITHOUT ESOPHAGITIS: ICD-10-CM

## 2020-12-16 DIAGNOSIS — I10 ESSENTIAL HYPERTENSION: ICD-10-CM

## 2020-12-16 DIAGNOSIS — E78.2 MIXED HYPERLIPIDEMIA: ICD-10-CM

## 2020-12-16 PROCEDURE — 99214 OFFICE O/P EST MOD 30 MIN: CPT | Mod: 95 | Performed by: INTERNAL MEDICINE

## 2020-12-16 RX ORDER — ATORVASTATIN CALCIUM 20 MG/1
20 TABLET, FILM COATED ORAL DAILY
Qty: 90 TABLET | Refills: 3 | Status: SHIPPED | OUTPATIENT
Start: 2020-12-16 | End: 2022-03-11

## 2020-12-16 RX ORDER — METOPROLOL SUCCINATE 50 MG/1
50 TABLET, EXTENDED RELEASE ORAL DAILY
Qty: 90 TABLET | Refills: 3 | Status: SHIPPED | OUTPATIENT
Start: 2020-12-16 | End: 2022-02-10

## 2020-12-16 RX ORDER — AMLODIPINE BESYLATE 5 MG/1
TABLET ORAL
Qty: 180 TABLET | Refills: 3 | Status: SHIPPED | OUTPATIENT
Start: 2020-12-16 | End: 2022-02-10

## 2020-12-16 RX ORDER — PANTOPRAZOLE SODIUM 40 MG/1
40 TABLET, DELAYED RELEASE ORAL DAILY
Qty: 90 TABLET | Refills: 3 | Status: SHIPPED | OUTPATIENT
Start: 2020-12-16 | End: 2022-02-10

## 2020-12-16 NOTE — PROGRESS NOTES
"Jordyn Craig is a 75 year old female who is being evaluated via a billable video visit.      The patient has been notified of following:     \"This video visit will be conducted via a call between you and your physician/provider. We have found that certain health care needs can be provided without the need for an in-person physical exam.  This service lets us provide the care you need with a video conversation.  If a prescription is necessary we can send it directly to your pharmacy.  If lab work is needed we can place an order for that and you can then stop by our lab to have the test done at a later time.    Video visits are billed at different rates depending on your insurance coverage.  Please reach out to your insurance provider with any questions.    If during the course of the call the physician/provider feels a video visit is not appropriate, you will not be charged for this service.\"    Patient has given verbal consent for Video visit? Yes  How would you like to obtain your AVS? MyChart  If you are dropped from the video visit, the video invite should be resent to: Send to e-mail at: [email or web address]  Will anyone else be joining your video visit? No  Subjective     Jordyn Craig is a 75 year old female who presents today via video visit for the following health issues:    HPI     Chief Complaint   Patient presents with     Video Visit     recheck medications     Video Start Time: 4:25 PM    She is doing ok, staying home and healthy.    No changes in her health. Exercises with walking.      Taking her medications, no problems with her medications.  BP can go up and down, stress can vary it.     She had a flu shot and a tetanus shot this year.        Past Medical History:   Diagnosis Date     Anxiety      Migraine      White coat syndrome with high blood pressure without hypertension      Current Outpatient Medications   Medication     acetaminophen (TYLENOL 8 HOUR) 650 MG CR tablet     amLODIPine " (NORVASC) 5 MG tablet     atorvastatin (LIPITOR) 20 MG tablet     metoprolol succinate ER (TOPROL-XL) 50 MG 24 hr tablet     pantoprazole (PROTONIX) 40 MG EC tablet     No current facility-administered medications for this visit.      .  Social History     Tobacco Use     Smoking status: Never Smoker     Smokeless tobacco: Never Used   Substance Use Topics     Alcohol use: No     Alcohol/week: 0.0 standard drinks     Drug use: No         Review of Systems   CONSTITUTIONAL: NEGATIVE for fever, chills, change in weight  INTEGUMENTARY/SKIN: NEGATIVE for worrisome rashes, moles or lesions  EYES: NEGATIVE for vision changes or irritation  ENT/MOUTH: NEGATIVE for ear, mouth and throat problems  RESP: NEGATIVE for significant cough or SOB  BREAST: NEGATIVE for masses, tenderness or discharge  CV: NEGATIVE for chest pain, palpitations or peripheral edema  GI: NEGATIVE for nausea, abdominal pain, heartburn, or change in bowel habits  : NEGATIVE for frequency, dysuria, or hematuria  MUSCULOSKELETAL: NEGATIVE for significant arthralgias or myalgia  NEURO: NEGATIVE for weakness, dizziness or paresthesias  ENDOCRINE: NEGATIVE for temperature intolerance, skin/hair changes  HEME: NEGATIVE for bleeding problems  PSYCHIATRIC: NEGATIVE for changes in mood or affect      Objective           Vitals:  No vitals were obtained today due to virtual visit.    Physical Exam     GENERAL: Healthy, alert and no distress  EYES: Eyes grossly normal to inspection.  No discharge or erythema, or obvious scleral/conjunctival abnormalities.  RESP: No audible wheeze, cough, or visible cyanosis.  No visible retractions or increased work of breathing.    SKIN: Visible skin clear. No significant rash, abnormal pigmentation or lesions.  NEURO: Cranial nerves grossly intact.  Mentation and speech appropriate for age.  PSYCH: Mentation appears normal, affect normal/bright, judgement and insight intact, normal speech and appearance well-groomed.       "    Assessment & Plan     Essential hypertension  Blood pressure doing well at home she has had a history of some whitecoat hypertension.  But she thinks stress at home makes her blood pressure up to is done okay 134/58, continue amlodipine and metoprolol.  - amLODIPine (NORVASC) 5 MG tablet; TAKE TWO TABLETS BY MOUTH EVERY DAY    Mixed hyperlipidemia  Hyperlipidemia is treated with atorvastatin, will check labs in the next 6 months when Covid calms down.  She is doing fine with the statin therapy.  - atorvastatin (LIPITOR) 20 MG tablet; Take 1 tablet (20 mg) by mouth daily    Gastroesophageal reflux disease without esophagitis  GERD-Protonix helps to treat her symptoms she is having stress from her 's health issues he does have melanoma and is undergoing chemotherapy.  Continue the Protonix.  - pantoprazole (PROTONIX) 40 MG EC tablet; Take 1 tablet (40 mg) by mouth daily     BMI:   Estimated body mass index is 26.1 kg/m  as calculated from the following:    Height as of 1/14/20: 1.588 m (5' 2.5\").    Weight as of 1/14/20: 65.8 kg (145 lb).                No follow-ups on file.    Joe Geller MD  [facility]      Video-Visit Details    Type of service:  Video Visit    Video End Time:4:34 PM    Originating Location (pt. Location): Home    Distant Location (provider location):  [facility]     Platform used for Video Visit: Merlyn        "

## 2021-03-02 ENCOUNTER — IMMUNIZATION (OUTPATIENT)
Dept: FAMILY MEDICINE | Facility: CLINIC | Age: 76
End: 2021-03-02
Payer: COMMERCIAL

## 2021-03-02 PROCEDURE — 91300 PR COVID VAC PFIZER DIL RECON 30 MCG/0.3 ML IM: CPT

## 2021-03-02 PROCEDURE — 0001A PR COVID VAC PFIZER DIL RECON 30 MCG/0.3 ML IM: CPT

## 2021-03-06 ENCOUNTER — MYC MEDICAL ADVICE (OUTPATIENT)
Dept: INTERNAL MEDICINE | Facility: CLINIC | Age: 76
End: 2021-03-06

## 2021-03-08 NOTE — TELEPHONE ENCOUNTER
My Chart message sent to pt advising she call to set up appt. With Dr. Geller to discuss her anxiety. I had tried calling her to assist in doing so , but NA. She was probably still resting...............KELSI Mensah

## 2021-03-13 ENCOUNTER — HEALTH MAINTENANCE LETTER (OUTPATIENT)
Age: 76
End: 2021-03-13

## 2021-03-23 ENCOUNTER — IMMUNIZATION (OUTPATIENT)
Dept: FAMILY MEDICINE | Facility: CLINIC | Age: 76
End: 2021-03-23
Attending: FAMILY MEDICINE
Payer: COMMERCIAL

## 2021-03-23 PROCEDURE — 91300 PR COVID VAC PFIZER DIL RECON 30 MCG/0.3 ML IM: CPT

## 2021-03-23 PROCEDURE — 0002A PR COVID VAC PFIZER DIL RECON 30 MCG/0.3 ML IM: CPT

## 2021-04-13 NOTE — TELEPHONE ENCOUNTER
Note from 01/14/20 office visit.        
Patient is aware she is to take both.   
Reason for Call:  Other prescription question    Detailed comments: Pt calling and states Dr Yimi started her on amLODIPine (NORVASC) 5 MG tablet, pt wondering if she is supposed to continue taking metoprolol succinate ER (TOPROL-XL) 50 MG 24 hr tablet as well. Please advise.     Phone Number Patient can be reached at: Home number on file [phone] (home)    Best Time:     Can we leave a detailed message on this number? YES    Call taken on 1/17/2020 at 8:54 AM by Jamee Camacho    
none

## 2021-08-10 ENCOUNTER — OFFICE VISIT (OUTPATIENT)
Dept: INTERNAL MEDICINE | Facility: CLINIC | Age: 76
End: 2021-08-10
Payer: COMMERCIAL

## 2021-08-10 VITALS
OXYGEN SATURATION: 98 % | HEIGHT: 62 IN | BODY MASS INDEX: 26.68 KG/M2 | DIASTOLIC BLOOD PRESSURE: 66 MMHG | HEART RATE: 84 BPM | SYSTOLIC BLOOD PRESSURE: 136 MMHG | WEIGHT: 145 LBS | RESPIRATION RATE: 16 BRPM | TEMPERATURE: 97.6 F

## 2021-08-10 DIAGNOSIS — Z12.31 ENCOUNTER FOR SCREENING MAMMOGRAM FOR BREAST CANCER: ICD-10-CM

## 2021-08-10 DIAGNOSIS — Z12.11 SPECIAL SCREENING FOR MALIGNANT NEOPLASMS, COLON: ICD-10-CM

## 2021-08-10 DIAGNOSIS — K21.9 GASTROESOPHAGEAL REFLUX DISEASE WITHOUT ESOPHAGITIS: ICD-10-CM

## 2021-08-10 DIAGNOSIS — I10 ESSENTIAL HYPERTENSION: ICD-10-CM

## 2021-08-10 DIAGNOSIS — H25.9 AGE-RELATED CATARACT OF BOTH EYES, UNSPECIFIED AGE-RELATED CATARACT TYPE: ICD-10-CM

## 2021-08-10 DIAGNOSIS — F43.23 ADJUSTMENT DISORDER WITH MIXED ANXIETY AND DEPRESSED MOOD: ICD-10-CM

## 2021-08-10 DIAGNOSIS — E78.2 MIXED HYPERLIPIDEMIA: ICD-10-CM

## 2021-08-10 DIAGNOSIS — Z00.00 ENCOUNTER FOR MEDICARE ANNUAL WELLNESS EXAM: Primary | ICD-10-CM

## 2021-08-10 LAB
ALBUMIN SERPL-MCNC: 4.6 G/DL (ref 3.4–5)
ALP SERPL-CCNC: 96 U/L (ref 40–150)
ALT SERPL W P-5'-P-CCNC: 26 U/L (ref 0–50)
ANION GAP SERPL CALCULATED.3IONS-SCNC: 5 MMOL/L (ref 3–14)
AST SERPL W P-5'-P-CCNC: 15 U/L (ref 0–45)
BILIRUB SERPL-MCNC: 0.8 MG/DL (ref 0.2–1.3)
BUN SERPL-MCNC: 10 MG/DL (ref 7–30)
CALCIUM SERPL-MCNC: 9.5 MG/DL (ref 8.5–10.1)
CHLORIDE BLD-SCNC: 106 MMOL/L (ref 94–109)
CHOLEST SERPL-MCNC: 182 MG/DL
CO2 SERPL-SCNC: 28 MMOL/L (ref 20–32)
CREAT SERPL-MCNC: 0.78 MG/DL (ref 0.52–1.04)
CREAT UR-MCNC: 53 MG/DL
FASTING STATUS PATIENT QL REPORTED: YES
GFR SERPL CREATININE-BSD FRML MDRD: 74 ML/MIN/1.73M2
GLUCOSE BLD-MCNC: 118 MG/DL (ref 70–99)
HDLC SERPL-MCNC: 80 MG/DL
LDLC SERPL CALC-MCNC: 86 MG/DL
MICROALBUMIN UR-MCNC: 18 MG/L
MICROALBUMIN/CREAT UR: 33.96 MG/G CR (ref 0–25)
NONHDLC SERPL-MCNC: 102 MG/DL
POTASSIUM BLD-SCNC: 3.8 MMOL/L (ref 3.4–5.3)
PROT SERPL-MCNC: 8.2 G/DL (ref 6.8–8.8)
SODIUM SERPL-SCNC: 139 MMOL/L (ref 133–144)
TRIGL SERPL-MCNC: 80 MG/DL

## 2021-08-10 PROCEDURE — 80053 COMPREHEN METABOLIC PANEL: CPT | Performed by: INTERNAL MEDICINE

## 2021-08-10 PROCEDURE — 82043 UR ALBUMIN QUANTITATIVE: CPT | Performed by: INTERNAL MEDICINE

## 2021-08-10 PROCEDURE — 99213 OFFICE O/P EST LOW 20 MIN: CPT | Mod: 25 | Performed by: INTERNAL MEDICINE

## 2021-08-10 PROCEDURE — 99397 PER PM REEVAL EST PAT 65+ YR: CPT | Performed by: INTERNAL MEDICINE

## 2021-08-10 PROCEDURE — 36415 COLL VENOUS BLD VENIPUNCTURE: CPT | Performed by: INTERNAL MEDICINE

## 2021-08-10 PROCEDURE — 80061 LIPID PANEL: CPT | Performed by: INTERNAL MEDICINE

## 2021-08-10 RX ORDER — CITALOPRAM HYDROBROMIDE 10 MG/1
10 TABLET ORAL DAILY
Qty: 30 TABLET | Refills: 3 | Status: SHIPPED | OUTPATIENT
Start: 2021-08-10 | End: 2021-11-29

## 2021-08-10 ASSESSMENT — ENCOUNTER SYMPTOMS
CONSTIPATION: 0
FREQUENCY: 0
ABDOMINAL PAIN: 0
DIARRHEA: 0
BREAST MASS: 0
PALPITATIONS: 1
CHILLS: 0
SORE THROAT: 0
HEARTBURN: 0
ARTHRALGIAS: 1
DIZZINESS: 0
FEVER: 0
MYALGIAS: 0
WEAKNESS: 0
NERVOUS/ANXIOUS: 1
EYE PAIN: 0
NAUSEA: 0
SHORTNESS OF BREATH: 0
COUGH: 0
DYSURIA: 0
HEADACHES: 0
HEMATOCHEZIA: 0
HEMATURIA: 0
PARESTHESIAS: 0

## 2021-08-10 ASSESSMENT — PAIN SCALES - GENERAL: PAINLEVEL: NO PAIN (0)

## 2021-08-10 ASSESSMENT — ACTIVITIES OF DAILY LIVING (ADL): CURRENT_FUNCTION: NO ASSISTANCE NEEDED

## 2021-08-10 ASSESSMENT — MIFFLIN-ST. JEOR: SCORE: 1097

## 2021-08-10 NOTE — PROGRESS NOTES
"SUBJECTIVE:   Jordyn Craig is a 76 year old female who presents for Preventive Visit.    Patient has been advised of split billing requirements and indicates understanding: Yes   Are you in the first 12 months of your Medicare coverage?  No    Healthy Habits:     In general, how would you rate your overall health?  Good    Frequency of exercise:  2-3 days/week    Duration of exercise:  30-45 minutes    Do you usually eat at least 4 servings of fruit and vegetables a day, include whole grains    & fiber and avoid regularly eating high fat or \"junk\" foods?  Yes    Taking medications regularly:  Yes    Medication side effects:  None    Ability to successfully perform activities of daily living:  No assistance needed    Home Safety:  No safety concerns identified    Hearing Impairment:  Difficulty following a conversation in a noisy restaurant or crowded room, feel that people are mumbling or not speaking clearly, difficulty following dialogue in the theater, need to ask people to speak up or repeat themselves and difficulty understanding soft or whispered speech    In the past 6 months, have you been bothered by leaking of urine?  No    In general, how would you rate your overall mental or emotional health?  Fair      PHQ-2 Total Score: 1    Additional concerns today:  No    Doing ok, gardening. Got her covid vaccine.  Mostly stays at home.  Needs to see the eye doctor.       Feels some anxiety and depression, wonders about a medication.   had melanoma and stressful.     Do you feel safe in your environment? Yes    Have you ever done Advance Care Planning? (For example, a Health Directive, POLST, or a discussion with a medical provider or your loved ones about your wishes): Yes, advance care planning is on file.    Fall risk  Fallen 2 or more times in the past year?: No  Any fall with injury in the past year?: No    Cognitive Screening   1) Repeat 3 items (Leader, Season, Table)    2) Clock draw: NORMAL  3) 3 " item recall: Recalls 3 objects  Results: 3 items recalled: COGNITIVE IMPAIRMENT LESS LIKELY    Mini-CogTM Copyright S Leobardo. Licensed by the author for use in [facility]; reprinted with permission (jose@.[facility]). All rights reserved.      Do you have sleep apnea, excessive snoring or daytime drowsiness?: no    Reviewed and updated as needed this visit by clinical staff  Tobacco   Meds              Reviewed and updated as needed this visit by Provider                Social History     Tobacco Use     Smoking status: Never Smoker     Smokeless tobacco: Never Used   Substance Use Topics     Alcohol use: No     Alcohol/week: 0.0 standard drinks     If you drink alcohol do you typically have >3 drinks per day or >7 drinks per week? No    Alcohol Use 8/10/2021   Prescreen: >3 drinks/day or >7 drinks/week? No       Current providers sharing in care for this patient include:   Patient Care Team:  Joe Geller MD as PCP - General (Internal Medicine)  Joe Geller MD as Assigned PCP    The following health maintenance items are reviewed in Epic and correct as of today:  Health Maintenance Due   Topic Date Due     DEXA  Never done     ANNUAL REVIEW OF HM ORDERS  Never done     DTAP/TDAP/TD IMMUNIZATION (1 - Tdap) Never done     ZOSTER IMMUNIZATION (1 of 2) Never done     COLORECTAL CANCER SCREENING  01/13/2020     MEDICARE ANNUAL WELLNESS VISIT  01/14/2021     FALL RISK ASSESSMENT  01/14/2021   Lab work is in processLab work is in process  Pneumonia Vaccine:up to date    Mammogram Screening - Patient over age 75, has elected to continue with screening.  Pertinent mammograms are reviewed under the imaging tab.    Review of Systems   Constitutional: Negative for chills and fever.   HENT: Positive for hearing loss. Negative for congestion, ear pain and sore throat.    Eyes: Positive for visual disturbance. Negative for pain.   Respiratory: Negative for cough and shortness of breath.    Cardiovascular:  "Positive for palpitations and peripheral edema. Negative for chest pain.   Gastrointestinal: Negative for abdominal pain, constipation, diarrhea, heartburn, hematochezia and nausea.   Breasts:  Negative for tenderness, breast mass and discharge.   Genitourinary: Negative for dysuria, frequency, genital sores, hematuria, pelvic pain, urgency, vaginal bleeding and vaginal discharge.   Musculoskeletal: Positive for arthralgias. Negative for myalgias.   Skin: Negative for rash.   Neurological: Negative for dizziness, weakness, headaches and paresthesias.   Psychiatric/Behavioral: Negative for mood changes. The patient is nervous/anxious.        OBJECTIVE:   /66   Pulse 84   Temp 97.6  F (36.4  C) (Temporal)   Resp 16   Ht 1.568 m (5' 1.75\")   Wt 65.8 kg (145 lb)   SpO2 98%   BMI 26.74 kg/m   Estimated body mass index is 26.74 kg/m  as calculated from the following:    Height as of this encounter: 1.568 m (5' 1.75\").    Weight as of this encounter: 65.8 kg (145 lb).  Physical Exam  GENERAL: healthy, alert and no distress  EYES: Eyes grossly normal to inspection, PERRL and conjunctivae and sclerae normal  HENT: ear canals and TM's normal, nose and mouth without ulcers or lesions  NECK: no adenopathy, no asymmetry, masses, or scars and thyroid normal to palpation  RESP: lungs clear to auscultation - no rales, rhonchi or wheezes  CV: regular rate and rhythm, normal S1 S2, no S3 or S4, no murmur, click or rub, no peripheral edema and peripheral pulses strong  ABDOMEN: soft, nontender, no hepatosplenomegaly, no masses and bowel sounds normal  MS: 1+ pitting edema in both ankles  SKIN: no suspicious lesions or rashes  NEURO: Normal strength and tone, mentation intact and speech normal  PSYCH: mentation appears normal, affect normal/bright        ASSESSMENT / PLAN:       ICD-10-CM    1. Encounter for Medicare annual wellness exam  Z00.00    2. Essential hypertension  I10 Comprehensive metabolic panel (BMP + Alb, " "Alk Phos, ALT, AST, Total. Bili, TP)     Albumin Random Urine Quantitative with Creat Ratio     Lipid panel reflex to direct LDL Fasting   3. Mixed hyperlipidemia  E78.2 Comprehensive metabolic panel (BMP + Alb, Alk Phos, ALT, AST, Total. Bili, TP)     Lipid panel reflex to direct LDL Fasting   4. Gastroesophageal reflux disease without esophagitis  K21.9    5. Adjustment disorder with mixed anxiety and depressed mood  F43.23 citalopram (CELEXA) 10 MG tablet   6. Special screening for malignant neoplasms, colon  Z12.11 Fecal colorectal cancer screen (FIT)     Patient who is overall doing well she has hypertension is controlled, hyperlipidemia which is treated for GERD which is treated well with pantoprazole.  We will refill her meds next winter.  She needs to do a fit test for colon cancer screening and a mammogram.  She has cataracts and needs to see the eye clinic so we will refer her to a [facility].    She has some depression and anxiety her  is going through melanoma treatment we will try her on citalopram 10 mg a day to help calm her down.  She does worry about Covid and has not been out a lot.  She has been vaccinated told her she does need to do her regular health care items and have some socialization with other vaccinated people.        Patient has been advised of split billing requirements and indicates understanding: Yes  COUNSELING:  Reviewed preventive health counseling, as reflected in patient instructions       Regular exercise       Healthy diet/nutrition    Estimated body mass index is 26.74 kg/m  as calculated from the following:    Height as of this encounter: 1.568 m (5' 1.75\").    Weight as of this encounter: 65.8 kg (145 lb).        She reports that she has never smoked. She has never used smokeless tobacco.      Appropriate preventive services were discussed with this patient, including applicable screening as appropriate for cardiovascular disease, diabetes, osteopenia/osteoporosis, and " glaucoma.  As appropriate for age/gender, discussed screening for colorectal cancer, prostate cancer, breast cancer, and cervical cancer. Checklist reviewing preventive services available has been given to the patient.    Reviewed patients plan of care and provided an AVS. The Basic Care Plan (routine screening as documented in Health Maintenance) for Jordyn meets the Care Plan requirement. This Care Plan has been established and reviewed with the Patient.    Counseling Resources:  ATP IV Guidelines  Pooled Cohorts Equation Calculator  Breast Cancer Risk Calculator  Breast Cancer: Medication to Reduce Risk  FRAX Risk Assessment  ICSI Preventive Guidelines  Dietary Guidelines for Americans, 2010  USDA's MyPlate  ASA Prophylaxis  Lung CA Screening    Joe Geller MD  [facility]    Identified Health Risks:

## 2021-08-17 ENCOUNTER — HOSPITAL ENCOUNTER (OUTPATIENT)
Dept: MAMMOGRAPHY | Facility: CLINIC | Age: 76
Discharge: HOME | End: 2021-08-17
Attending: INTERNAL MEDICINE | Admitting: INTERNAL MEDICINE
Payer: COMMERCIAL

## 2021-08-17 DIAGNOSIS — Z12.31 ENCOUNTER FOR SCREENING MAMMOGRAM FOR BREAST CANCER: ICD-10-CM

## 2021-08-17 PROCEDURE — 77063 BREAST TOMOSYNTHESIS BI: CPT

## 2021-10-23 ENCOUNTER — HEALTH MAINTENANCE LETTER (OUTPATIENT)
Age: 76
End: 2021-10-23

## 2021-10-28 DIAGNOSIS — Z11.59 ENCOUNTER FOR SCREENING FOR OTHER VIRAL DISEASES: ICD-10-CM

## 2021-11-05 ENCOUNTER — LAB (OUTPATIENT)
Dept: LAB | Facility: CLINIC | Age: 76
End: 2021-11-05
Payer: COMMERCIAL

## 2021-11-05 PROCEDURE — 82274 ASSAY TEST FOR BLOOD FECAL: CPT | Performed by: INTERNAL MEDICINE

## 2021-11-08 LAB — HEMOCCULT STL QL IA: NEGATIVE

## 2021-11-22 DIAGNOSIS — Z11.59 ENCOUNTER FOR SCREENING FOR OTHER VIRAL DISEASES: ICD-10-CM

## 2021-11-29 ENCOUNTER — LAB (OUTPATIENT)
Dept: LAB | Facility: CLINIC | Age: 76
End: 2021-11-29
Attending: OPHTHALMOLOGY
Payer: COMMERCIAL

## 2021-11-29 ENCOUNTER — OFFICE VISIT (OUTPATIENT)
Dept: INTERNAL MEDICINE | Facility: CLINIC | Age: 76
End: 2021-11-29
Payer: COMMERCIAL

## 2021-11-29 VITALS
SYSTOLIC BLOOD PRESSURE: 138 MMHG | TEMPERATURE: 97.9 F | BODY MASS INDEX: 27.15 KG/M2 | OXYGEN SATURATION: 96 % | WEIGHT: 147.25 LBS | DIASTOLIC BLOOD PRESSURE: 70 MMHG | HEART RATE: 90 BPM | RESPIRATION RATE: 20 BRPM

## 2021-11-29 DIAGNOSIS — F43.23 ADJUSTMENT DISORDER WITH MIXED ANXIETY AND DEPRESSED MOOD: ICD-10-CM

## 2021-11-29 DIAGNOSIS — Z11.59 ENCOUNTER FOR SCREENING FOR OTHER VIRAL DISEASES: ICD-10-CM

## 2021-11-29 DIAGNOSIS — H25.013 CORTICAL AGE-RELATED CATARACT OF BOTH EYES: ICD-10-CM

## 2021-11-29 DIAGNOSIS — Z01.818 PREOP GENERAL PHYSICAL EXAM: Primary | ICD-10-CM

## 2021-11-29 PROCEDURE — U0005 INFEC AGEN DETEC AMPLI PROBE: HCPCS

## 2021-11-29 PROCEDURE — 99214 OFFICE O/P EST MOD 30 MIN: CPT | Performed by: INTERNAL MEDICINE

## 2021-11-29 PROCEDURE — U0003 INFECTIOUS AGENT DETECTION BY NUCLEIC ACID (DNA OR RNA); SEVERE ACUTE RESPIRATORY SYNDROME CORONAVIRUS 2 (SARS-COV-2) (CORONAVIRUS DISEASE [COVID-19]), AMPLIFIED PROBE TECHNIQUE, MAKING USE OF HIGH THROUGHPUT TECHNOLOGIES AS DESCRIBED BY CMS-2020-01-R: HCPCS

## 2021-11-29 RX ORDER — INFLUENZA A VIRUS A/MICHIGAN/45/2015 X-275 (H1N1) ANTIGEN (FORMALDEHYDE INACTIVATED), INFLUENZA A VIRUS A/SINGAPORE/INFIMH-16-0019/2016 IVR-186 (H3N2) ANTIGEN (FORMALDEHYDE INACTIVATED), INFLUENZA B VIRUS B/PHUKET/3073/2013 ANTIGEN (FORMALDEHYDE INACTIVATED), AND INFLUENZA B VIRUS B/MARYLAND/15/2016 BX-69A ANTIGEN (FORMALDEHYDE INACTIVATED) 60; 60; 60; 60 UG/.7ML; UG/.7ML; UG/.7ML; UG/.7ML
INJECTION, SUSPENSION INTRAMUSCULAR
COMMUNITY
Start: 2021-10-29 | End: 2022-10-18

## 2021-11-29 RX ORDER — CITALOPRAM HYDROBROMIDE 10 MG/1
10 TABLET ORAL DAILY
Qty: 90 TABLET | Refills: 3 | Status: SHIPPED | OUTPATIENT
Start: 2021-11-29 | End: 2022-10-18

## 2021-11-29 ASSESSMENT — PAIN SCALES - GENERAL: PAINLEVEL: EXTREME PAIN (8)

## 2021-11-29 NOTE — PATIENT INSTRUCTIONS

## 2021-11-29 NOTE — PROGRESS NOTES
[address] [address]  Phone: [phone]  Primary Provider: Joe Geller  Pre-op Performing Provider: JOE GELLER      PREOPERATIVE EVALUATION:  Today's date: 11/29/2021    Jordyn Craig is a 76 year old female who presents for a preoperative evaluation.    Surgical Information:  Surgery/Procedure: PHACOEMULSIFICATION, CATARACT, WITH STANDARD INTRAOCULAR LENS IMPLANT INSERTION Right then Left  Surgery Location: [facility]  Surgeon: Dr. Carrasco and Dr. Miranda  Surgery Date: 12/02/2021 and 12/16/2021  Time of Surgery: 9:30am  Where patient plans to recover: At home with family  Fax number for surgical facility: ATTN: Kerri [phone]    Type of Anesthesia Anticipated: Local with MAC    Assessment & Plan     The proposed surgical procedure is considered LOW risk.    Problem List Items Addressed This Visit     None      Visit Diagnoses     Preop general physical exam    -  Primary    Adjustment disorder with mixed anxiety and depressed mood        Relevant Medications    citalopram (CELEXA) 10 MG tablet    Cortical age-related cataract of both eyes                           RECOMMENDATION:  APPROVAL GIVEN to proceed with proposed procedure, without further diagnostic evaluation.                      Subjective     HPI related to upcoming procedure: Cataract surgery needing surgery for blurred vision.     Preop Questions 11/29/2021   1. Have you ever had a heart attack or stroke? No   2. Have you ever had surgery on your heart or blood vessels, such as a stent placement, a coronary artery bypass, or surgery on an artery in your head, neck, heart, or legs? No   3. Do you have chest pain with activity? No   4. Do you have a history of  heart failure? No   5. Do you currently have a cold, bronchitis or symptoms of other infection? No   6. Do you have a cough, shortness of breath, or wheezing? No   7. Do you or anyone in your family have previous history  of blood clots? No   8. Do you or does anyone in your family have a serious bleeding problem such as prolonged bleeding following surgeries or cuts? No   9. Have you ever had problems with anemia or been told to take iron pills? No   10. Have you had any abnormal blood loss such as black, tarry or bloody stools, or abnormal vaginal bleeding? No   11. Have you ever had a blood transfusion? No   12. Are you willing to have a blood transfusion if it is medically needed before, during, or after your surgery? Yes   13. Have you or any of your relatives ever had problems with anesthesia? No   14. Do you have sleep apnea, excessive snoring or daytime drowsiness? No   15. Do you have any artifical heart valves or other implanted medical devices like a pacemaker, defibrillator, or continuous glucose monitor? No   16. Do you have artificial joints? No   17. Are you allergic to latex? No       Health Care Directive:  Patient has a Health Care Directive on file      Preoperative Review of :   reviewed - no record of controlled substances prescribed.      Status of Chronic Conditions:  HYPERLIPIDEMIA - Patient has a long history of significant Hyperlipidemia requiring medication for treatment with recent good control. Patient reports no problems or side effects with the medication.     HYPERTENSION - Patient has longstanding history of HTN , currently denies any symptoms referable to elevated blood pressure. Specifically denies chest pain, palpitations, dyspnea, orthopnea, PND or peripheral edema. Blood pressure readings have been in normal range. Current medication regimen is as listed below. Patient denies any side effects of medication.       Review of Systems  Constitutional, neuro, ENT, endocrine, pulmonary, cardiac, gastrointestinal, genitourinary, musculoskeletal, integument and psychiatric systems are negative, except as otherwise noted.    Patient Active Problem List    Diagnosis Date Noted     Essential  hypertension 12/16/2020     Priority: Medium     Mixed hyperlipidemia 12/16/2020     Priority: Medium     Elevated blood pressure reading without diagnosis of hypertension 06/15/2016     Priority: Medium     Gastroesophageal reflux disease without esophagitis 05/25/2016     Priority: Medium     Atypical chest pain 05/24/2016     Priority: Medium     Health Care Home 12/14/2012     Priority: Medium     Caroline Hernandez RN-LIZ HOOD / [facility]   [phone]    DX V65.8 REPLACED WITH [id number] [facility]ALTH CARE HOME (04/08/2013)        Past Medical History:   Diagnosis Date     Anxiety      Migraine      White coat syndrome with high blood pressure without hypertension      History reviewed. No pertinent surgical history.  Current Outpatient Medications   Medication Sig Dispense Refill     amLODIPine (NORVASC) 5 MG tablet TAKE TWO TABLETS BY MOUTH EVERY  tablet 3     atorvastatin (LIPITOR) 20 MG tablet Take 1 tablet (20 mg) by mouth daily 90 tablet 3     citalopram (CELEXA) 10 MG tablet Take 1 tablet (10 mg) by mouth daily 30 tablet 3     metoprolol succinate ER (TOPROL-XL) 50 MG 24 hr tablet Take 1 tablet (50 mg) by mouth daily 90 tablet 3     pantoprazole (PROTONIX) 40 MG EC tablet Take 1 tablet (40 mg) by mouth daily 90 tablet 3     acetaminophen (TYLENOL 8 HOUR) 650 MG CR tablet Take 2 tablets (1,300 mg) by mouth every 8 hours as needed For pain (Patient not taking: Reported on 8/10/2021) 60 tablet      FLUZONE HIGH-DOSE QUADRIVALENT 0.7 ML SHANI injection          Allergies   Allergen Reactions     Codeine Camsylate Nausea and Vomiting        Social History     Tobacco Use     Smoking status: Never Smoker     Smokeless tobacco: Never Used   Substance Use Topics     Alcohol use: No     Alcohol/week: 0.0 standard drinks       History   Drug Use No         Objective     /70   Pulse 90   Temp 97.9  F (36.6  C) (Temporal)   Resp 20   Wt 66.8 kg (147 lb 4 oz)   SpO2 96%   Breastfeeding  No   BMI 27.15 kg/m      Physical Exam    GENERAL APPEARANCE: healthy, alert and no distress     HENT: ear canals and TM's normal and nose and mouth without ulcers or lesions     NECK: no adenopathy, no asymmetry, masses, or scars and thyroid normal to palpation     RESP: lungs clear to auscultation - no rales, rhonchi or wheezes     CV: regular rates and rhythm, normal S1 S2, no S3 or S4 and no murmur, click or rub     MS: slight edema      SKIN: no suspicious lesions or rashes     NEURO: Normal strength and tone, sensory exam grossly normal, mentation intact and speech normal     PSYCH: mentation appears normal. and affect normal/bright     LYMPHATICS: No cervical adenopathy    Recent Labs   Lab Test 08/10/21  1005 01/14/20  0840    142   POTASSIUM 3.8 4.1   CR 0.78 0.60        Diagnostics:  No labs were ordered during this visit.   No EKG required for low risk surgery (cataract, skin procedure, breast biopsy, etc).    Revised Cardiac Risk Index (RCRI):  The patient has the following serious cardiovascular risks for perioperative complications:   - No serious cardiac risks = 0 points     RCRI Interpretation: 1 point: Class II (low risk - 0.9% complication rate)           Signed Electronically by: Joe Geller MD  Copy of this evaluation report is provided to requesting physician.

## 2021-11-29 NOTE — H&P (VIEW-ONLY)
[address] [address]  Phone: [phone]  Primary Provider: Joe Geller  Pre-op Performing Provider: JOE GELLER      PREOPERATIVE EVALUATION:  Today's date: 11/29/2021    Jordyn Craig is a 76 year old female who presents for a preoperative evaluation.    Surgical Information:  Surgery/Procedure: PHACOEMULSIFICATION, CATARACT, WITH STANDARD INTRAOCULAR LENS IMPLANT INSERTION Right then Left  Surgery Location: [facility]  Surgeon: Dr. Carrasco and Dr. Miranda  Surgery Date: 12/02/2021 and 12/16/2021  Time of Surgery: 9:30am  Where patient plans to recover: At home with family  Fax number for surgical facility: ATTN: [facility]ri [phone]    Type of Anesthesia Anticipated: Local with MAC    Assessment & Plan     The proposed surgical procedure is considered LOW risk.    Problem List Items Addressed This Visit     None      Visit Diagnoses     Preop general physical exam    -  Primary    Adjustment disorder with mixed anxiety and depressed mood        Relevant Medications    citalopram (CELEXA) 10 MG tablet    Cortical age-related cataract of both eyes                           RECOMMENDATION:  APPROVAL GIVEN to proceed with proposed procedure, without further diagnostic evaluation.                      Subjective     HPI related to upcoming procedure: Cataract surgery needing surgery for blurred vision.     Preop Questions 11/29/2021   1. Have you ever had a heart attack or stroke? No   2. Have you ever had surgery on your heart or blood vessels, such as a stent placement, a coronary artery bypass, or surgery on an artery in your head, neck, heart, or legs? No   3. Do you have chest pain with activity? No   4. Do you have a history of  heart failure? No   5. Do you currently have a cold, bronchitis or symptoms of other infection? No   6. Do you have a cough, shortness of breath, or wheezing? No   7. Do you or anyone in your family have previous history  of blood clots? No   8. Do you or does anyone in your family have a serious bleeding problem such as prolonged bleeding following surgeries or cuts? No   9. Have you ever had problems with anemia or been told to take iron pills? No   10. Have you had any abnormal blood loss such as black, tarry or bloody stools, or abnormal vaginal bleeding? No   11. Have you ever had a blood transfusion? No   12. Are you willing to have a blood transfusion if it is medically needed before, during, or after your surgery? Yes   13. Have you or any of your relatives ever had problems with anesthesia? No   14. Do you have sleep apnea, excessive snoring or daytime drowsiness? No   15. Do you have any artifical heart valves or other implanted medical devices like a pacemaker, defibrillator, or continuous glucose monitor? No   16. Do you have artificial joints? No   17. Are you allergic to latex? No       Health Care Directive:  Patient has a Health Care Directive on file      Preoperative Review of :   reviewed - no record of controlled substances prescribed.      Status of Chronic Conditions:  HYPERLIPIDEMIA - Patient has a long history of significant Hyperlipidemia requiring medication for treatment with recent good control. Patient reports no problems or side effects with the medication.     HYPERTENSION - Patient has longstanding history of HTN , currently denies any symptoms referable to elevated blood pressure. Specifically denies chest pain, palpitations, dyspnea, orthopnea, PND or peripheral edema. Blood pressure readings have been in normal range. Current medication regimen is as listed below. Patient denies any side effects of medication.       Review of Systems  Constitutional, neuro, ENT, endocrine, pulmonary, cardiac, gastrointestinal, genitourinary, musculoskeletal, integument and psychiatric systems are negative, except as otherwise noted.    Patient Active Problem List    Diagnosis Date Noted     Essential  hypertension 12/16/2020     Priority: Medium     Mixed hyperlipidemia 12/16/2020     Priority: Medium     Elevated blood pressure reading without diagnosis of hypertension 06/15/2016     Priority: Medium     Gastroesophageal reflux disease without esophagitis 05/25/2016     Priority: Medium     Atypical chest pain 05/24/2016     Priority: Medium     Health Care Home 12/14/2012     Priority: Medium     Caroline Hernandez RN-[facility] / [facility]   [phone]    DX V65.8 REPLACED WITH [id number] CARE HOME (04/08/2013)        Past Medical History:   Diagnosis Date     Anxiety      Migraine      White coat syndrome with high blood pressure without hypertension      History reviewed. No pertinent surgical history.  Current Outpatient Medications   Medication Sig Dispense Refill     amLODIPine (NORVASC) 5 MG tablet TAKE TWO TABLETS BY MOUTH EVERY  tablet 3     atorvastatin (LIPITOR) 20 MG tablet Take 1 tablet (20 mg) by mouth daily 90 tablet 3     citalopram (CELEXA) 10 MG tablet Take 1 tablet (10 mg) by mouth daily 30 tablet 3     metoprolol succinate ER (TOPROL-XL) 50 MG 24 hr tablet Take 1 tablet (50 mg) by mouth daily 90 tablet 3     pantoprazole (PROTONIX) 40 MG EC tablet Take 1 tablet (40 mg) by mouth daily 90 tablet 3     acetaminophen (TYLENOL 8 HOUR) 650 MG CR tablet Take 2 tablets (1,300 mg) by mouth every 8 hours as needed For pain (Patient not taking: Reported on 8/10/2021) 60 tablet      FLUZONE HIGH-DOSE QUADRIVALENT 0.7 ML SHANI injection          Allergies   Allergen Reactions     Codeine Camsylate Nausea and Vomiting        Social History     Tobacco Use     Smoking status: Never Smoker     Smokeless tobacco: Never Used   Substance Use Topics     Alcohol use: No     Alcohol/week: 0.0 standard drinks       History   Drug Use No         Objective     /70   Pulse 90   Temp 97.9  F (36.6  C) (Temporal)   Resp 20   Wt 66.8 kg (147 lb 4 oz)   SpO2 96%   Breastfeeding  No   BMI 27.15 kg/m      Physical Exam    GENERAL APPEARANCE: healthy, alert and no distress     HENT: ear canals and TM's normal and nose and mouth without ulcers or lesions     NECK: no adenopathy, no asymmetry, masses, or scars and thyroid normal to palpation     RESP: lungs clear to auscultation - no rales, rhonchi or wheezes     CV: regular rates and rhythm, normal S1 S2, no S3 or S4 and no murmur, click or rub     MS: slight edema      SKIN: no suspicious lesions or rashes     NEURO: Normal strength and tone, sensory exam grossly normal, mentation intact and speech normal     PSYCH: mentation appears normal. and affect normal/bright     LYMPHATICS: No cervical adenopathy    Recent Labs   Lab Test 08/10/21  1005 01/14/20  0840    142   POTASSIUM 3.8 4.1   CR 0.78 0.60        Diagnostics:  No labs were ordered during this visit.   No EKG required for low risk surgery (cataract, skin procedure, breast biopsy, etc).    Revised Cardiac Risk Index (RCRI):  The patient has the following serious cardiovascular risks for perioperative complications:   - No serious cardiac risks = 0 points     RCRI Interpretation: 1 point: Class II (low risk - 0.9% complication rate)           Signed Electronically by: Joe Geller MD  Copy of this evaluation report is provided to requesting physician.

## 2021-11-29 NOTE — H&P (VIEW-ONLY)
[address] [address]  Phone: [phone]  Primary Provider: Joe Geller  Pre-op Performing Provider: JOE GELLER      PREOPERATIVE EVALUATION:  Today's date: 11/29/2021    Jordyn Craig is a 76 year old female who presents for a preoperative evaluation.    Surgical Information:  Surgery/Procedure: PHACOEMULSIFICATION, CATARACT, WITH STANDARD INTRAOCULAR LENS IMPLANT INSERTION Right then Left  Surgery Location: [facility]  Surgeon: Dr. Carrasco and Dr. Miranda  Surgery Date: 12/02/2021 and 12/16/2021  Time of Surgery: 9:30am  Where patient plans to recover: At home with family  Fax number for surgical facility: ATTN: Kerri [phone]    Type of Anesthesia Anticipated: Local with MAC    Assessment & Plan     The proposed surgical procedure is considered LOW risk.    Problem List Items Addressed This Visit     None      Visit Diagnoses     Preop general physical exam    -  Primary    Adjustment disorder with mixed anxiety and depressed mood        Relevant Medications    citalopram (CELEXA) 10 MG tablet    Cortical age-related cataract of both eyes                           RECOMMENDATION:  APPROVAL GIVEN to proceed with proposed procedure, without further diagnostic evaluation.                      Subjective     HPI related to upcoming procedure: Cataract surgery needing surgery for blurred vision.     Preop Questions 11/29/2021   1. Have you ever had a heart attack or stroke? No   2. Have you ever had surgery on your heart or blood vessels, such as a stent placement, a coronary artery bypass, or surgery on an artery in your head, neck, heart, or legs? No   3. Do you have chest pain with activity? No   4. Do you have a history of  heart failure? No   5. Do you currently have a cold, bronchitis or symptoms of other infection? No   6. Do you have a cough, shortness of breath, or wheezing? No   7. Do you or anyone in your family have previous history  of blood clots? No   8. Do you or does anyone in your family have a serious bleeding problem such as prolonged bleeding following surgeries or cuts? No   9. Have you ever had problems with anemia or been told to take iron pills? No   10. Have you had any abnormal blood loss such as black, tarry or bloody stools, or abnormal vaginal bleeding? No   11. Have you ever had a blood transfusion? No   12. Are you willing to have a blood transfusion if it is medically needed before, during, or after your surgery? Yes   13. Have you or any of your relatives ever had problems with anesthesia? No   14. Do you have sleep apnea, excessive snoring or daytime drowsiness? No   15. Do you have any artifical heart valves or other implanted medical devices like a pacemaker, defibrillator, or continuous glucose monitor? No   16. Do you have artificial joints? No   17. Are you allergic to latex? No       Health Care Directive:  Patient has a Health Care Directive on file      Preoperative Review of :   reviewed - no record of controlled substances prescribed.      Status of Chronic Conditions:  HYPERLIPIDEMIA - Patient has a long history of significant Hyperlipidemia requiring medication for treatment with recent good control. Patient reports no problems or side effects with the medication.     HYPERTENSION - Patient has longstanding history of HTN , currently denies any symptoms referable to elevated blood pressure. Specifically denies chest pain, palpitations, dyspnea, orthopnea, PND or peripheral edema. Blood pressure readings have been in normal range. Current medication regimen is as listed below. Patient denies any side effects of medication.       Review of Systems  Constitutional, neuro, ENT, endocrine, pulmonary, cardiac, gastrointestinal, genitourinary, musculoskeletal, integument and psychiatric systems are negative, except as otherwise noted.    Patient Active Problem List    Diagnosis Date Noted     Essential  hypertension 12/16/2020     Priority: Medium     Mixed hyperlipidemia 12/16/2020     Priority: Medium     Elevated blood pressure reading without diagnosis of hypertension 06/15/2016     Priority: Medium     Gastroesophageal reflux disease without esophagitis 05/25/2016     Priority: Medium     Atypical chest pain 05/24/2016     Priority: Medium     Health Care Home 12/14/2012     Priority: Medium     Caroline Hernandez RN-LIZ HOOD / LINDA [facility]   [phone]    DX V65.8 REPLACED WITH [id number] (04/08/2013)        Past Medical History:   Diagnosis Date     Anxiety      Migraine      White coat syndrome with high blood pressure without hypertension      History reviewed. No pertinent surgical history.  Current Outpatient Medications   Medication Sig Dispense Refill     amLODIPine (NORVASC) 5 MG tablet TAKE TWO TABLETS BY MOUTH EVERY  tablet 3     atorvastatin (LIPITOR) 20 MG tablet Take 1 tablet (20 mg) by mouth daily 90 tablet 3     citalopram (CELEXA) 10 MG tablet Take 1 tablet (10 mg) by mouth daily 30 tablet 3     metoprolol succinate ER (TOPROL-XL) 50 MG 24 hr tablet Take 1 tablet (50 mg) by mouth daily 90 tablet 3     pantoprazole (PROTONIX) 40 MG EC tablet Take 1 tablet (40 mg) by mouth daily 90 tablet 3     acetaminophen (TYLENOL 8 HOUR) 650 MG CR tablet Take 2 tablets (1,300 mg) by mouth every 8 hours as needed For pain (Patient not taking: Reported on 8/10/2021) 60 tablet      FLUZONE HIGH-DOSE QUADRIVALENT 0.7 ML HSANI injection          Allergies   Allergen Reactions     Codeine Camsylate Nausea and Vomiting        Social History     Tobacco Use     Smoking status: Never Smoker     Smokeless tobacco: Never Used   Substance Use Topics     Alcohol use: No     Alcohol/week: 0.0 standard drinks       History   Drug Use No         Objective     /70   Pulse 90   Temp 97.9  F (36.6  C) (Temporal)   Resp 20   Wt 66.8 kg (147 lb 4 oz)   SpO2 96%   Breastfeeding  No   BMI 27.15 kg/m      Physical Exam    GENERAL APPEARANCE: healthy, alert and no distress     HENT: ear canals and TM's normal and nose and mouth without ulcers or lesions     NECK: no adenopathy, no asymmetry, masses, or scars and thyroid normal to palpation     RESP: lungs clear to auscultation - no rales, rhonchi or wheezes     CV: regular rates and rhythm, normal S1 S2, no S3 or S4 and no murmur, click or rub     MS: slight edema      SKIN: no suspicious lesions or rashes     NEURO: Normal strength and tone, sensory exam grossly normal, mentation intact and speech normal     PSYCH: mentation appears normal. and affect normal/bright     LYMPHATICS: No cervical adenopathy    Recent Labs   Lab Test 08/10/21  1005 01/14/20  0840    142   POTASSIUM 3.8 4.1   CR 0.78 0.60        Diagnostics:  No labs were ordered during this visit.   No EKG required for low risk surgery (cataract, skin procedure, breast biopsy, etc).    Revised Cardiac Risk Index (RCRI):  The patient has the following serious cardiovascular risks for perioperative complications:   - No serious cardiac risks = 0 points     RCRI Interpretation: 1 point: Class II (low risk - 0.9% complication rate)           Signed Electronically by: Joe Geller MD  Copy of this evaluation report is provided to requesting physician.

## 2021-11-30 LAB — SARS-COV-2 RNA RESP QL NAA+PROBE: NEGATIVE

## 2021-12-02 ENCOUNTER — ANESTHESIA EVENT (OUTPATIENT)
Dept: SURGERY | Facility: CLINIC | Age: 76
End: 2021-12-02
Payer: COMMERCIAL

## 2021-12-02 ENCOUNTER — ANESTHESIA (OUTPATIENT)
Dept: SURGERY | Facility: CLINIC | Age: 76
End: 2021-12-02
Payer: COMMERCIAL

## 2021-12-02 ENCOUNTER — HOSPITAL ENCOUNTER (OUTPATIENT)
Facility: CLINIC | Age: 76
Discharge: HOME | End: 2021-12-02
Attending: OPHTHALMOLOGY | Admitting: OPHTHALMOLOGY
Payer: COMMERCIAL

## 2021-12-02 VITALS
OXYGEN SATURATION: 97 % | BODY MASS INDEX: 27.15 KG/M2 | HEART RATE: 64 BPM | RESPIRATION RATE: 15 BRPM | WEIGHT: 147.25 LBS | DIASTOLIC BLOOD PRESSURE: 60 MMHG | SYSTOLIC BLOOD PRESSURE: 139 MMHG | TEMPERATURE: 98.2 F

## 2021-12-02 PROCEDURE — V2632 POST CHMBR INTRAOCULAR LENS: HCPCS | Performed by: OPHTHALMOLOGY

## 2021-12-02 PROCEDURE — 250N000009 HC RX 250: Performed by: OPHTHALMOLOGY

## 2021-12-02 PROCEDURE — 250N000009 HC RX 250: Performed by: NURSE ANESTHETIST, CERTIFIED REGISTERED

## 2021-12-02 PROCEDURE — 370N000004 HC ANESTHESIA CATARACT PACKAGE: Performed by: OPHTHALMOLOGY

## 2021-12-02 PROCEDURE — 360N000007 HC CATARACT SURGICAL PACKAGE: Performed by: OPHTHALMOLOGY

## 2021-12-02 PROCEDURE — 250N000011 HC RX IP 250 OP 636: Performed by: OPHTHALMOLOGY

## 2021-12-02 PROCEDURE — 761N000008 HC RECOVERY CATRACT PACKAGE: Performed by: OPHTHALMOLOGY

## 2021-12-02 PROCEDURE — 250N000011 HC RX IP 250 OP 636: Performed by: NURSE ANESTHETIST, CERTIFIED REGISTERED

## 2021-12-02 DEVICE — EYE IMP IOL AMO PCL TECNIS ZCB00 21.0: Type: IMPLANTABLE DEVICE | Site: EYE | Status: FUNCTIONAL

## 2021-12-02 RX ORDER — LIDOCAINE 40 MG/G
CREAM TOPICAL
Status: DISCONTINUED | OUTPATIENT
Start: 2021-12-02 | End: 2021-12-02 | Stop reason: HOSPADM

## 2021-12-02 RX ORDER — MOXIFLOXACIN 5 MG/ML
1 SOLUTION/ DROPS OPHTHALMIC
Status: COMPLETED | OUTPATIENT
Start: 2021-12-02 | End: 2021-12-02

## 2021-12-02 RX ORDER — PHENYLEPHRINE HYDROCHLORIDE 25 MG/ML
1 SOLUTION/ DROPS OPHTHALMIC
Status: COMPLETED | OUTPATIENT
Start: 2021-12-02 | End: 2021-12-02

## 2021-12-02 RX ORDER — DICLOFENAC SODIUM 1 MG/ML
1 SOLUTION/ DROPS OPHTHALMIC
Status: COMPLETED | OUTPATIENT
Start: 2021-12-02 | End: 2021-12-02

## 2021-12-02 RX ORDER — PROPARACAINE HYDROCHLORIDE 5 MG/ML
1 SOLUTION/ DROPS OPHTHALMIC ONCE
Status: COMPLETED | OUTPATIENT
Start: 2021-12-02 | End: 2021-12-02

## 2021-12-02 RX ORDER — PROPARACAINE HYDROCHLORIDE 5 MG/ML
1 SOLUTION/ DROPS OPHTHALMIC ONCE
Status: DISCONTINUED | OUTPATIENT
Start: 2021-12-02 | End: 2021-12-02 | Stop reason: HOSPADM

## 2021-12-02 RX ORDER — TROPICAMIDE 10 MG/ML
1 SOLUTION/ DROPS OPHTHALMIC
Status: COMPLETED | OUTPATIENT
Start: 2021-12-02 | End: 2021-12-02

## 2021-12-02 RX ORDER — FENTANYL CITRATE 50 UG/ML
INJECTION, SOLUTION INTRAMUSCULAR; INTRAVENOUS PRN
Status: DISCONTINUED | OUTPATIENT
Start: 2021-12-02 | End: 2021-12-02

## 2021-12-02 RX ADMIN — MOXIFLOXACIN 1 DROP: 5 SOLUTION/ DROPS OPHTHALMIC at 08:21

## 2021-12-02 RX ADMIN — PROPARACAINE HYDROCHLORIDE 1 DROP: 5 SOLUTION/ DROPS OPHTHALMIC at 08:06

## 2021-12-02 RX ADMIN — MIDAZOLAM 1 MG: 1 INJECTION INTRAMUSCULAR; INTRAVENOUS at 08:48

## 2021-12-02 RX ADMIN — PHENYLEPHRINE HYDROCHLORIDE 1 DROP: 25 SOLUTION/ DROPS OPHTHALMIC at 08:22

## 2021-12-02 RX ADMIN — MOXIFLOXACIN 1 DROP: 5 SOLUTION/ DROPS OPHTHALMIC at 08:14

## 2021-12-02 RX ADMIN — TROPICAMIDE 1 DROP: 10 SOLUTION/ DROPS OPHTHALMIC at 08:07

## 2021-12-02 RX ADMIN — TROPICAMIDE 1 DROP: 10 SOLUTION/ DROPS OPHTHALMIC at 08:14

## 2021-12-02 RX ADMIN — FENTANYL CITRATE 50 MCG: 50 INJECTION, SOLUTION INTRAMUSCULAR; INTRAVENOUS at 08:48

## 2021-12-02 RX ADMIN — DICLOFENAC SODIUM 1 DROP: 1 SOLUTION OPHTHALMIC at 08:07

## 2021-12-02 RX ADMIN — TROPICAMIDE 1 DROP: 10 SOLUTION/ DROPS OPHTHALMIC at 08:22

## 2021-12-02 RX ADMIN — PHENYLEPHRINE HYDROCHLORIDE 1 DROP: 25 SOLUTION/ DROPS OPHTHALMIC at 08:07

## 2021-12-02 RX ADMIN — DICLOFENAC SODIUM 1 DROP: 1 SOLUTION OPHTHALMIC at 08:14

## 2021-12-02 RX ADMIN — LIDOCAINE HYDROCHLORIDE 1 ML: 10 INJECTION, SOLUTION EPIDURAL; INFILTRATION; INTRACAUDAL; PERINEURAL at 08:24

## 2021-12-02 RX ADMIN — MOXIFLOXACIN 1 DROP: 5 SOLUTION/ DROPS OPHTHALMIC at 08:07

## 2021-12-02 RX ADMIN — PHENYLEPHRINE HYDROCHLORIDE 1 DROP: 25 SOLUTION/ DROPS OPHTHALMIC at 08:14

## 2021-12-02 RX ADMIN — DICLOFENAC SODIUM 1 DROP: 1 SOLUTION OPHTHALMIC at 08:21

## 2021-12-02 NOTE — ANESTHESIA POSTPROCEDURE EVALUATION
Patient: Jordyn Craig    Procedure: Procedure(s):  PHACOEMULSIFICATION, CATARACT, WITH STANDARD INTRAOCULAR LENS IMPLANT INSERTION       Diagnosis:Cataract [H26.9]  Diagnosis Additional Information: No value filed.    Anesthesia Type:  MAC    Note:  Disposition: Outpatient   Postop Pain Control: Uneventful            Sign Out: Well controlled pain   PONV: No   Neuro/Psych: Uneventful            Sign Out: Acceptable/Baseline neuro status   Airway/Respiratory: Uneventful            Sign Out: Acceptable/Baseline resp. status   CV/Hemodynamics: Uneventful            Sign Out: Acceptable CV status   Other NRE: NONE   DID A NON-ROUTINE EVENT OCCUR? No    Event details/Postop Comments:  Pt was happy with anesthesia care.  No complications.  I will follow up with the pt if needed.           Last vitals:  Vitals Value Taken Time   /60 12/02/21 0940   Temp     Pulse 64 12/02/21 0935   Resp 15 12/02/21 0935   SpO2 97 % 12/02/21 0940   Vitals shown include unvalidated device data.    Electronically Signed By: BRETT Prakash CRNA  December 2, 2021  10:09 AM

## 2021-12-02 NOTE — DISCHARGE INSTRUCTIONS
POST CATARACT SURGERY EYE INSTRUCTIONS  [facility]           Eye Medications    VIGAMOX/OFLOXACIN (Tan Cap)    KETOROLAC (Haas Cap)    PREDNISOLONE (Pink or White Cap)    If you opted for the individual bottles of eye medications follow these instructions.    *Instill one drop from each bottle into the operative eye 3 times a day until each  bottle is empty.    *Wait 5 minutes between each drop.    If you opted for the one bottle containing all 3 eye medications, follow these instructions.    *Instill one drop into the operative eye 3 times a day until the bottle is empty.       - If your are currently being treated for glaucoma please continue your    medication as normally prescribed.     - Wear eye shield while sleeping for 3 days     - Refrain from heavy lifting, bending, straining, or strenuous activity for 1      week.     - Do not rub or push on the operative eye for 1 week (you may wipe the    eye lid(s) gently with a wet wash cloth to remove matter from                         eyelashes).     - You may bathe or shower, but do not get the eye wet for 1 month      (swimming, hot tubs or other water activities).     - Minor itching, scratching sensation, burning sensation, etc. is normal.     Report any severe eye pain or loss of vision.     - Please call our office at [phone] if you have questions or     concerns.

## 2021-12-02 NOTE — ANESTHESIA PREPROCEDURE EVALUATION
Anesthesia Pre-Procedure Evaluation    Patient: Jordyn Craig   MRN: [id number] : 1945        Preoperative Diagnosis: Cataract [H26.9]    Procedure : Procedure(s):  PHACOEMULSIFICATION, CATARACT, WITH STANDARD INTRAOCULAR LENS IMPLANT INSERTION          Past Medical History:   Diagnosis Date     Anxiety      Migraine      White coat syndrome with high blood pressure without hypertension       History reviewed. No pertinent surgical history.   Allergies   Allergen Reactions     Codeine Camsylate Nausea and Vomiting      Social History     Tobacco Use     Smoking status: Never Smoker     Smokeless tobacco: Never Used   Substance Use Topics     Alcohol use: No     Alcohol/week: 0.0 standard drinks      Wt Readings from Last 1 Encounters:   21 66.8 kg (147 lb 4 oz)        Anesthesia Evaluation            ROS/MED HX  ENT/Pulmonary:       Neurologic:     (+) migraines,     Cardiovascular: Comment: Atypical chest pain    (+) Dyslipidemia hypertension-----Previous cardiac testing   Echo: Date: 2016 Results:    Stress Test: Date: Results:    ECG Reviewed: Date: 2016 Results:  SR  Cath: Date: Results:      METS/Exercise Tolerance:     Hematologic:       Musculoskeletal:       GI/Hepatic:     (+) GERD, Asymptomatic on medication,     Renal/Genitourinary:  - neg Renal ROS     Endo:  - neg endo ROS     Psychiatric/Substance Use:     (+) psychiatric history anxiety and depression     Infectious Disease:  - neg infectious disease ROS     Malignancy:  - neg malignancy ROS     Other:            Physical Exam    Airway        Mallampati: II   TM distance: > 3 FB   Neck ROM: full   Mouth opening: > 3 cm    Respiratory Devices and Support         Dental           Cardiovascular   cardiovascular exam normal          Pulmonary   pulmonary exam normal                OUTSIDE LABS:  CBC:   Lab Results   Component Value Date    WBC 8.4 2016    HGB 13.4 2016    HCT 39.2 2016     2016      BMP:   Lab Results   Component Value Date     08/10/2021     01/14/2020    POTASSIUM 3.8 08/10/2021    POTASSIUM 4.1 01/14/2020    CHLORIDE 106 08/10/2021    CHLORIDE 108 01/14/2020    CO2 28 08/10/2021    CO2 28 01/14/2020    BUN 10 08/10/2021    BUN 11 01/14/2020    CR 0.78 08/10/2021    CR 0.60 01/14/2020     (H) 08/10/2021     (H) 01/14/2020     COAGS:   Lab Results   Component Value Date    PTT 25 05/24/2016    INR 0.94 05/24/2016     POC: No results found for: BGM, HCG, HCGS  HEPATIC:   Lab Results   Component Value Date    ALBUMIN 4.6 08/10/2021    PROTTOTAL 8.2 08/10/2021    ALT 26 08/10/2021    AST 15 08/10/2021    ALKPHOS 96 08/10/2021    BILITOTAL 0.8 08/10/2021     OTHER:   Lab Results   Component Value Date    DONNY 9.5 08/10/2021    MAG 1.7 05/24/2016    LIPASE 134 05/24/2016    TSH 1.65 03/22/2019       Anesthesia Plan    ASA Status:  2   NPO Status:  NPO Appropriate    Anesthesia Type: MAC.     - Reason for MAC: immobility needed, straight local not clinically adequate   Induction: Intravenous.   Maintenance: Other.        Consents    Anesthesia Plan(s) and associated risks, benefits, and realistic alternatives discussed. Questions answered and patient/representative(s) expressed understanding.    - Discussed:     - Discussed with:  Patient      - Extended Intubation/Ventilatory Support Discussed: No.      - Patient is DNR/DNI Status: No    Use of blood products discussed: No .     Postoperative Care            Comments:    Other Comments: The risks and benefits of anesthesia, and the alternatives where applicable, have been discussed with the patient, and they wish to proceed.            Eladio Evans, BRETT CRNA

## 2021-12-02 NOTE — OP NOTE
PREOPERATIVE DIAGNOSIS: Cataract, Right eye.   POSTOPERATIVE DIAGNOSIS: Cataract, Right eye.   OPERATION: Phacoemulsification with implantation of posterior chamber intraocular lens, Right eye.   ANESTHESIA: Monitored anesthesia care.   INDICATIONS FOR SURGERY: Jordyn Craig has noted a progressive decline in the vision of her Right eye secondary to a cataract. This has affected her ability to perform routine functions including reading. The patient has progressive cataract changes consistent with her vision and symptoms.     PROCEDURE: Informed consent was obtained from the patient preoperatively with the risks and alternatives reviewed, including the possibility of loss of vision. In the preoperative area, the patient was administered topical anesthetic consisting of 2% Xylocaine jelly. The patient was taken to the operating room. The face was prepped and draped in the usual sterile fashion. Attention was directed to the Right eye. A stab incision was made at the limbus with a 15-degree blade. Viscoat was used to replace aqueous. A keratome was used to make a limbal self-sealing incision 2.5 mm in diameter. A curvilinear capsulorrhexis was performed with the Utrata forceps. Hydrodissection was carried out. The nucleus was removed with the phacoemulsification handpiece in a four-quadrant cracking technique. The cortex was removed with the irrigation and aspiration handpiece. The posterior capsule remained intact. Provisc was used to inflate the capsular bag. A posterior chamber intraocular lens was taken from its case and inspected. It was free of defects and it was folded into the shooter. The lens was then injected into the eye by directing the leading haptic into the capsular bag. The trailing haptic was then placed in the eye with a haptic . The lens centered well. The Provisc was removed from the eye with the I&A handpiece. The eye was inflated with balanced salt solution. The wound was inspected and  found to be watertight. Topical Vigamox and Pred Forte were applied. An eye shield was placed over the eye. The patient tolerated the procedure well and left the operating area in good condition.     Implant Name Type Inv. Item Serial No.  Lot No. LRB No. Used Action   EYE IMP IOL SEA PCL TECNIS ZCB00 21.0 - [id number] Lens/Eye Implant EYE IMP IOL SEA PCL TECNIS ZCB00 21.0 [id number] [employer]  Right 1 Implanted       Joe Carrasco M.D.

## 2021-12-02 NOTE — ANESTHESIA CARE TRANSFER NOTE
Patient: Jordyn Craig    Procedure: Procedure(s):  PHACOEMULSIFICATION, CATARACT, WITH STANDARD INTRAOCULAR LENS IMPLANT INSERTION       Diagnosis: Cataract [H26.9]  Diagnosis Additional Information: No value filed.    Anesthesia Type:   MAC     Note:    Oropharynx: oropharynx clear of all foreign objects and spontaneously breathing  Level of Consciousness: drowsy  Oxygen Supplementation: face mask    Independent Airway: airway patency satisfactory and stable  Dentition: dentition unchanged  Vital Signs Stable: post-procedure vital signs reviewed and stable  Report to RN Given: handoff report given  Patient transferred to: Phase II    Handoff Report: Identifed the Patient, Identified the Reponsible Provider, Reviewed the pertinent medical history, Discussed the surgical course, Reviewed Intra-OP anesthesia mangement and issues during anesthesia, Set expectations for post-procedure period and Allowed opportunity for questions and acknowledgement of understanding      Vitals:  Vitals Value Taken Time   /60 12/02/21 0940   Temp     Pulse 64 12/02/21 0935   Resp 15 12/02/21 0935   SpO2 97 % 12/02/21 0940   Vitals shown include unvalidated device data.    Electronically Signed By: BRETT Prakash CRNA  December 2, 2021  10:09 AM

## 2021-12-13 ENCOUNTER — LAB (OUTPATIENT)
Dept: LAB | Facility: CLINIC | Age: 76
End: 2021-12-13
Attending: OPHTHALMOLOGY
Payer: COMMERCIAL

## 2021-12-13 DIAGNOSIS — Z11.59 ENCOUNTER FOR SCREENING FOR OTHER VIRAL DISEASES: ICD-10-CM

## 2021-12-13 PROCEDURE — U0003 INFECTIOUS AGENT DETECTION BY NUCLEIC ACID (DNA OR RNA); SEVERE ACUTE RESPIRATORY SYNDROME CORONAVIRUS 2 (SARS-COV-2) (CORONAVIRUS DISEASE [COVID-19]), AMPLIFIED PROBE TECHNIQUE, MAKING USE OF HIGH THROUGHPUT TECHNOLOGIES AS DESCRIBED BY CMS-2020-01-R: HCPCS

## 2021-12-13 PROCEDURE — U0005 INFEC AGEN DETEC AMPLI PROBE: HCPCS

## 2021-12-14 LAB — SARS-COV-2 RNA RESP QL NAA+PROBE: NEGATIVE

## 2021-12-15 ENCOUNTER — ANESTHESIA EVENT (OUTPATIENT)
Dept: SURGERY | Facility: CLINIC | Age: 76
End: 2021-12-15
Payer: COMMERCIAL

## 2021-12-16 ENCOUNTER — HOSPITAL ENCOUNTER (OUTPATIENT)
Facility: CLINIC | Age: 76
Discharge: HOME | End: 2021-12-16
Attending: OPHTHALMOLOGY | Admitting: OPHTHALMOLOGY
Payer: COMMERCIAL

## 2021-12-16 ENCOUNTER — ANESTHESIA (OUTPATIENT)
Dept: SURGERY | Facility: CLINIC | Age: 76
End: 2021-12-16
Payer: COMMERCIAL

## 2021-12-16 VITALS
RESPIRATION RATE: 16 BRPM | TEMPERATURE: 97.8 F | HEART RATE: 62 BPM | WEIGHT: 147 LBS | BODY MASS INDEX: 26.05 KG/M2 | HEIGHT: 63 IN | DIASTOLIC BLOOD PRESSURE: 68 MMHG | SYSTOLIC BLOOD PRESSURE: 147 MMHG | OXYGEN SATURATION: 94 %

## 2021-12-16 PROCEDURE — 370N000004 HC ANESTHESIA CATARACT PACKAGE: Performed by: OPHTHALMOLOGY

## 2021-12-16 PROCEDURE — 250N000009 HC RX 250: Performed by: OPHTHALMOLOGY

## 2021-12-16 PROCEDURE — 250N000011 HC RX IP 250 OP 636: Performed by: NURSE ANESTHETIST, CERTIFIED REGISTERED

## 2021-12-16 PROCEDURE — 360N000007 HC CATARACT SURGICAL PACKAGE: Performed by: OPHTHALMOLOGY

## 2021-12-16 PROCEDURE — 250N000011 HC RX IP 250 OP 636: Performed by: OPHTHALMOLOGY

## 2021-12-16 PROCEDURE — V2632 POST CHMBR INTRAOCULAR LENS: HCPCS | Performed by: OPHTHALMOLOGY

## 2021-12-16 PROCEDURE — 761N000008 HC RECOVERY CATRACT PACKAGE: Performed by: OPHTHALMOLOGY

## 2021-12-16 DEVICE — EYE IMP IOL AMO PCL TECNIS ZCB00 21.0: Type: IMPLANTABLE DEVICE | Site: EYE | Status: FUNCTIONAL

## 2021-12-16 RX ORDER — SODIUM CHLORIDE, SODIUM LACTATE, POTASSIUM CHLORIDE, CALCIUM CHLORIDE 600; 310; 30; 20 MG/100ML; MG/100ML; MG/100ML; MG/100ML
INJECTION, SOLUTION INTRAVENOUS CONTINUOUS
Status: DISCONTINUED | OUTPATIENT
Start: 2021-12-16 | End: 2021-12-16 | Stop reason: HOSPADM

## 2021-12-16 RX ORDER — TROPICAMIDE 10 MG/ML
1 SOLUTION/ DROPS OPHTHALMIC
Status: COMPLETED | OUTPATIENT
Start: 2021-12-16 | End: 2021-12-16

## 2021-12-16 RX ORDER — DICLOFENAC SODIUM 1 MG/ML
1 SOLUTION/ DROPS OPHTHALMIC
Status: COMPLETED | OUTPATIENT
Start: 2021-12-16 | End: 2021-12-16

## 2021-12-16 RX ORDER — PROPARACAINE HYDROCHLORIDE 5 MG/ML
1 SOLUTION/ DROPS OPHTHALMIC ONCE
Status: COMPLETED | OUTPATIENT
Start: 2021-12-16 | End: 2021-12-16

## 2021-12-16 RX ORDER — FENTANYL CITRATE 50 UG/ML
INJECTION, SOLUTION INTRAMUSCULAR; INTRAVENOUS PRN
Status: DISCONTINUED | OUTPATIENT
Start: 2021-12-16 | End: 2021-12-16

## 2021-12-16 RX ORDER — MOXIFLOXACIN 5 MG/ML
1 SOLUTION/ DROPS OPHTHALMIC
Status: COMPLETED | OUTPATIENT
Start: 2021-12-16 | End: 2021-12-16

## 2021-12-16 RX ORDER — TIMOLOL MALEATE 5 MG/ML
SOLUTION/ DROPS OPHTHALMIC PRN
Status: DISCONTINUED | OUTPATIENT
Start: 2021-12-16 | End: 2021-12-16 | Stop reason: HOSPADM

## 2021-12-16 RX ADMIN — TROPICAMIDE 1 DROP: 10 SOLUTION/ DROPS OPHTHALMIC at 08:10

## 2021-12-16 RX ADMIN — TROPICAMIDE 1 DROP: 10 SOLUTION/ DROPS OPHTHALMIC at 08:24

## 2021-12-16 RX ADMIN — DICLOFENAC SODIUM 1 DROP: 1 SOLUTION OPHTHALMIC at 08:24

## 2021-12-16 RX ADMIN — TROPICAMIDE 1 DROP: 10 SOLUTION/ DROPS OPHTHALMIC at 08:14

## 2021-12-16 RX ADMIN — FENTANYL CITRATE 50 MCG: 50 INJECTION, SOLUTION INTRAMUSCULAR; INTRAVENOUS at 09:22

## 2021-12-16 RX ADMIN — PROPARACAINE HYDROCHLORIDE 1 DROP: 5 SOLUTION/ DROPS OPHTHALMIC at 08:10

## 2021-12-16 RX ADMIN — MIDAZOLAM 1 MG: 1 INJECTION INTRAMUSCULAR; INTRAVENOUS at 09:22

## 2021-12-16 RX ADMIN — DICLOFENAC SODIUM 1 DROP: 1 SOLUTION OPHTHALMIC at 08:10

## 2021-12-16 RX ADMIN — MOXIFLOXACIN 1 DROP: 5 SOLUTION/ DROPS OPHTHALMIC at 08:24

## 2021-12-16 RX ADMIN — MOXIFLOXACIN 1 DROP: 5 SOLUTION/ DROPS OPHTHALMIC at 08:10

## 2021-12-16 RX ADMIN — DICLOFENAC SODIUM 1 DROP: 1 SOLUTION OPHTHALMIC at 08:14

## 2021-12-16 RX ADMIN — MOXIFLOXACIN 1 DROP: 5 SOLUTION/ DROPS OPHTHALMIC at 08:14

## 2021-12-16 ASSESSMENT — MIFFLIN-ST. JEOR: SCORE: 1125.92

## 2021-12-16 NOTE — DISCHARGE INSTRUCTIONS
POST CATARACT SURGERY EYE INSTRUCTIONS  [facility]           Eye Medications    VIGAMOX/OFLOXACIN (Tan Cap)    KETOROLAC (Haas Cap)    PREDNISOLONE (Pink or White Cap)    If you opted for the individual bottles of eye medications follow these instructions.    *Instill one drop from each bottle into the operative eye 3 times a day until each  bottle is empty.    *Wait 5 minutes between each drop.    If you opted for the one bottle containing all 3 eye medications, follow these instructions.    *Instill one drop into the operative eye 3 times a day until the bottle is empty.       - If your are currently being treated for glaucoma please continue your    medication as normally prescribed.     - Wear eye shield while sleeping for 3 days     - Refrain from heavy lifting, bending, straining, or strenuous activity for 1      week.     - Do not rub or push on the operative eye for 1 week (you may wipe the    eye lid(s) gently with a wet wash cloth to remove matter from                         eyelashes).     - You may bathe or shower, but do not get the eye wet for 1 month      (swimming, hot tubs or other water activities).     - Minor itching, scratching sensation, burning sensation, etc. is normal.     Report any severe eye pain or loss of vision.     - Please call our office at [phone] if you have questions or     concerns.

## 2021-12-16 NOTE — ANESTHESIA CARE TRANSFER NOTE
Patient: Jordyn Craig    Procedure: Procedure(s):  PHACOEMULSIFICATION, CATARACT, WITH STANDARD INTRAOCULAR LENS IMPLANT INSERTION       Diagnosis: Cataract [H26.9]  Diagnosis Additional Information: No value filed.    Anesthesia Type:   MAC     Note:    Oropharynx: oropharynx clear of all foreign objects and spontaneously breathing  Level of Consciousness: drowsy  Oxygen Supplementation: face mask    Independent Airway: airway patency satisfactory and stable  Dentition: dentition unchanged  Vital Signs Stable: post-procedure vital signs reviewed and stable  Report to RN Given: handoff report given  Patient transferred to: Phase II    Handoff Report: Identifed the Patient, Identified the Reponsible Provider, Reviewed the pertinent medical history, Discussed the surgical course, Reviewed Intra-OP anesthesia mangement and issues during anesthesia, Set expectations for post-procedure period and Allowed opportunity for questions and acknowledgement of understanding      Vitals:  Vitals Value Taken Time   BP     Temp     Pulse     Resp     SpO2 96 % 12/16/21 0943   Vitals shown include unvalidated device data.    Electronically Signed By: BRETT Gage CRNA  December 16, 2021  9:44 AM

## 2021-12-16 NOTE — OP NOTE
[facility]  Ophthalmology Operative Note    PREOPERATIVE DIAGNOSIS: Cataract, Left eye.   POSTOPERATIVE DIAGNOSIS: Cataract, Left eye.   OPERATION: Cataract extraction with placement of posterior chamber intraocular lens in the Left eye.   ANESTHESIA: Topical   INDICATIONS FOR PROCEDURE: Jordyn Craig was seen in the [facility] for decreased visual acuity in the Left eye. The patient was found to have a cataract in the Left eye. The risks, benefits, alternatives and goals of cataract extraction were discussed with the patient, and after adequate discussion the patient understood and agreed to these, and a signed informed consent was obtained prior to the procedure.   DESCRIPTION OF PROCEDURE: After proper patient identification, topical anesthesia was applied to the Left eye. The patient was brought to the operating room and the Left eye was prepped and draped in the usual manner for intraocular surgery. A lid speculum was placed in the Left eye. A paracentesis was then created and the anterior chamber was filled with 1% non-preserved lidocaine followed by Viscoat. A clear corneal incision was then created, tunneling 2 mm into the cornea before entering the anterior chamber. A continuous curvilinear capsulorrhexis was then created using a cystotome and Utrata forceps. Hydrodissection was carried out with BSS on a blunt-tip cannula and the lens rotated freely within the capsular bag. Phacoemulsification was then carried out using the stop and chop technique. Residual cortical material was removed using the I&A handpiece. The capsular bag was then filled with Provisc and an injectable lens was injected into the capsular bag. The lens showed good centration and stability. Residual viscoelastic was removed using the I&A handpiece. The wound was then hydrated and the anterior chamber reformed through the paracentesis. The wound was checked and found to be sealed. Topical  drops of Vigamox and a Econopred were placed in the patient's Left eye followed by a Dubois shield over the top of this. The patient tolerated the procedure well without complications and was told to follow up in the clinic in the next postoperative day.     Implant Name Type Inv. Item Serial No.  Lot No. LRB No. Used Action   EYE IMP IOL SEA PCL TECNIS ZCB00 21.0 - [id number] Lens/Eye Implant EYE IMP IOL SEA PCL TECNIS ZCB00 21.0 [id number] [employer]  Left 1 Implanted        MARILEE MAC MD

## 2021-12-16 NOTE — ANESTHESIA PREPROCEDURE EVALUATION
Anesthesia Pre-Procedure Evaluation    Patient: Jordyn Craig   MRN: [id number] : 1945        Preoperative Diagnosis: Cataract [H26.9]    Procedure : Procedure(s):  PHACOEMULSIFICATION, CATARACT, WITH STANDARD INTRAOCULAR LENS IMPLANT INSERTION          Past Medical History:   Diagnosis Date     Anxiety      Migraine      White coat syndrome with high blood pressure without hypertension       Past Surgical History:   Procedure Laterality Date     PHACOEMULSIFICATION WITH STANDARD INTRAOCULAR LENS IMPLANT Right 2021    Procedure: PHACOEMULSIFICATION, CATARACT, WITH STANDARD INTRAOCULAR LENS IMPLANT INSERTION;  Surgeon: Joe Carrasco MD;  Location: [facility]      Allergies   Allergen Reactions     Codeine Camsylate Nausea and Vomiting      Social History     Tobacco Use     Smoking status: Never Smoker     Smokeless tobacco: Never Used   Substance Use Topics     Alcohol use: No     Alcohol/week: 0.0 standard drinks      Wt Readings from Last 1 Encounters:   21 66.8 kg (147 lb 4 oz)        Anesthesia Evaluation   Pt has had prior anesthetic. Type: MAC.    No history of anesthetic complications       ROS/MED HX  ENT/Pulmonary:  - neg pulmonary ROS     Neurologic:     (+) migraines,     Cardiovascular: Comment: Atypical chest pain    (+) Dyslipidemia hypertension-----Previous cardiac testing   Echo: Date: 2016 Results:    Stress Test: Date: Results:    ECG Reviewed: Date: 2016 Results:  SR  Cath: Date: Results:      METS/Exercise Tolerance:     Hematologic:  - neg hematologic  ROS     Musculoskeletal:  - neg musculoskeletal ROS     GI/Hepatic:     (+) GERD, Asymptomatic on medication,     Renal/Genitourinary:  - neg Renal ROS     Endo:  - neg endo ROS     Psychiatric/Substance Use:     (+) psychiatric history anxiety and depression     Infectious Disease:  - neg infectious disease ROS     Malignancy:  - neg malignancy ROS     Other:  - neg other ROS          Physical Exam    Airway   airway exam normal      Mallampati: II   TM distance: > 3 FB   Neck ROM: full   Mouth opening: > 3 cm    Respiratory Devices and Support         Dental       (+) caps      Cardiovascular   cardiovascular exam normal       Rhythm and rate: regular and normal     Pulmonary   pulmonary exam normal        breath sounds clear to auscultation           OUTSIDE LABS:  CBC:   Lab Results   Component Value Date    WBC 8.4 05/24/2016    HGB 13.4 05/24/2016    HCT 39.2 05/24/2016     05/24/2016     BMP:   Lab Results   Component Value Date     08/10/2021     01/14/2020    POTASSIUM 3.8 08/10/2021    POTASSIUM 4.1 01/14/2020    CHLORIDE 106 08/10/2021    CHLORIDE 108 01/14/2020    CO2 28 08/10/2021    CO2 28 01/14/2020    BUN 10 08/10/2021    BUN 11 01/14/2020    CR 0.78 08/10/2021    CR 0.60 01/14/2020     (H) 08/10/2021     (H) 01/14/2020     COAGS:   Lab Results   Component Value Date    PTT 25 05/24/2016    INR 0.94 05/24/2016     POC: No results found for: BGM, HCG, HCGS  HEPATIC:   Lab Results   Component Value Date    ALBUMIN 4.6 08/10/2021    PROTTOTAL 8.2 08/10/2021    ALT 26 08/10/2021    AST 15 08/10/2021    ALKPHOS 96 08/10/2021    BILITOTAL 0.8 08/10/2021     OTHER:   Lab Results   Component Value Date    DONNY 9.5 08/10/2021    MAG 1.7 05/24/2016    LIPASE 134 05/24/2016    TSH 1.65 03/22/2019       Anesthesia Plan    ASA Status:  2   NPO Status:  NPO Appropriate    Anesthesia Type: MAC.      Maintenance: TIVA.        Consents    Anesthesia Plan(s) and associated risks, benefits, and realistic alternatives discussed. Questions answered and patient/representative(s) expressed understanding.    - Discussed:     - Discussed with:  Patient    Use of blood products discussed: No .     Postoperative Care    Pain management: IV analgesics.        Comments:    Other Comments: The risks and benefits of anesthesia, and the alternatives where applicable, have been discussed with the patient,  and they wish to proceed.            BRETT Gage CRNA

## 2021-12-16 NOTE — ANESTHESIA POSTPROCEDURE EVALUATION
Patient: Jordyn Craig    Procedure: Procedure(s):  PHACOEMULSIFICATION, CATARACT, WITH STANDARD INTRAOCULAR LENS IMPLANT INSERTION       Diagnosis:Cataract [H26.9]  Diagnosis Additional Information: No value filed.    Anesthesia Type:  MAC    Note:  Disposition: Outpatient   Postop Pain Control: Uneventful            Sign Out: Well controlled pain   PONV: No   Neuro/Psych: Uneventful            Sign Out: Acceptable/Baseline neuro status   Airway/Respiratory: Uneventful            Sign Out: Acceptable/Baseline resp. status   CV/Hemodynamics: Uneventful            Sign Out: Acceptable CV status   Other NRE: NONE   DID A NON-ROUTINE EVENT OCCUR? No    Event details/Postop Comments:  Pt was happy with anesthesia care.  No complications.  I will follow up with the pt if needed.           Last vitals:  Vitals Value Taken Time   /59 12/16/21 0950   Temp     Pulse 61 12/16/21 0950   Resp 17 12/16/21 0950   SpO2 93 % 12/16/21 0957   Vitals shown include unvalidated device data.    Electronically Signed By: BRETT Gage CRNA  December 16, 2021  9:58 AM

## 2022-02-09 DIAGNOSIS — K21.9 GASTROESOPHAGEAL REFLUX DISEASE WITHOUT ESOPHAGITIS: ICD-10-CM

## 2022-02-09 DIAGNOSIS — I10 ESSENTIAL HYPERTENSION: ICD-10-CM

## 2022-02-10 RX ORDER — PANTOPRAZOLE SODIUM 40 MG/1
TABLET, DELAYED RELEASE ORAL
Qty: 90 TABLET | Refills: 3 | Status: SHIPPED | OUTPATIENT
Start: 2022-02-10 | End: 2022-10-18

## 2022-02-10 RX ORDER — AMLODIPINE BESYLATE 5 MG/1
TABLET ORAL
Qty: 180 TABLET | Refills: 3 | Status: SHIPPED | OUTPATIENT
Start: 2022-02-10 | End: 2022-10-18

## 2022-02-10 RX ORDER — METOPROLOL SUCCINATE 50 MG/1
TABLET, EXTENDED RELEASE ORAL
Qty: 90 TABLET | Refills: 3 | Status: SHIPPED | OUTPATIENT
Start: 2022-02-10 | End: 2022-10-18

## 2022-02-10 NOTE — TELEPHONE ENCOUNTER
Routing refill request to provider for review/approval because:  Labs out of range: Bp  A break in medication    BP Readings from Last 3 Encounters:   12/16/21 (!) 147/68   12/02/21 139/60   11/29/21 138/70

## 2022-03-11 DIAGNOSIS — E78.2 MIXED HYPERLIPIDEMIA: ICD-10-CM

## 2022-03-11 RX ORDER — ATORVASTATIN CALCIUM 20 MG/1
TABLET, FILM COATED ORAL
Qty: 90 TABLET | Refills: 1 | Status: SHIPPED | OUTPATIENT
Start: 2022-03-11 | End: 2022-09-09

## 2022-09-07 DIAGNOSIS — E78.2 MIXED HYPERLIPIDEMIA: ICD-10-CM

## 2022-09-09 RX ORDER — ATORVASTATIN CALCIUM 20 MG/1
TABLET, FILM COATED ORAL
Qty: 90 TABLET | Refills: 0 | Status: SHIPPED | OUTPATIENT
Start: 2022-09-09 | End: 2022-10-18

## 2022-09-09 NOTE — TELEPHONE ENCOUNTER
Pending Prescriptions:                       Disp   Refills    atorvastatin (LIPITOR) 20 MG tablet [Phar*90 tab*0            Sig: TAKE ONE TABLET BY MOUTH ONCE DAILY    Medication is being filled for 1 time perry refill only due to:  Patient is due for annual and fasting labs    Please call and help schedule.  Thank you!

## 2022-10-09 ENCOUNTER — HEALTH MAINTENANCE LETTER (OUTPATIENT)
Age: 77
End: 2022-10-09

## 2022-10-18 ENCOUNTER — OFFICE VISIT (OUTPATIENT)
Dept: INTERNAL MEDICINE | Facility: CLINIC | Age: 77
End: 2022-10-18
Payer: COMMERCIAL

## 2022-10-18 VITALS
HEIGHT: 63 IN | TEMPERATURE: 98 F | OXYGEN SATURATION: 98 % | WEIGHT: 140 LBS | BODY MASS INDEX: 24.8 KG/M2 | DIASTOLIC BLOOD PRESSURE: 66 MMHG | SYSTOLIC BLOOD PRESSURE: 128 MMHG | HEART RATE: 74 BPM | RESPIRATION RATE: 16 BRPM

## 2022-10-18 DIAGNOSIS — F43.23 ADJUSTMENT DISORDER WITH MIXED ANXIETY AND DEPRESSED MOOD: ICD-10-CM

## 2022-10-18 DIAGNOSIS — K21.9 GASTROESOPHAGEAL REFLUX DISEASE WITHOUT ESOPHAGITIS: ICD-10-CM

## 2022-10-18 DIAGNOSIS — Z00.00 MEDICARE ANNUAL WELLNESS VISIT, SUBSEQUENT: Primary | ICD-10-CM

## 2022-10-18 DIAGNOSIS — Z23 HIGH PRIORITY FOR 2019-NCOV VACCINE: ICD-10-CM

## 2022-10-18 DIAGNOSIS — Z23 NEED FOR PROPHYLACTIC VACCINATION AND INOCULATION AGAINST INFLUENZA: ICD-10-CM

## 2022-10-18 DIAGNOSIS — I10 ESSENTIAL HYPERTENSION: ICD-10-CM

## 2022-10-18 DIAGNOSIS — E78.2 MIXED HYPERLIPIDEMIA: ICD-10-CM

## 2022-10-18 LAB
ALBUMIN SERPL-MCNC: 4.5 G/DL (ref 3.4–5)
ALP SERPL-CCNC: 80 U/L (ref 40–150)
ALT SERPL W P-5'-P-CCNC: 24 U/L (ref 0–50)
ANION GAP SERPL CALCULATED.3IONS-SCNC: 3 MMOL/L (ref 3–14)
AST SERPL W P-5'-P-CCNC: 18 U/L (ref 0–45)
BILIRUB SERPL-MCNC: 0.9 MG/DL (ref 0.2–1.3)
BUN SERPL-MCNC: 10 MG/DL (ref 7–30)
CALCIUM SERPL-MCNC: 9.7 MG/DL (ref 8.5–10.1)
CHLORIDE BLD-SCNC: 106 MMOL/L (ref 94–109)
CHOLEST SERPL-MCNC: 163 MG/DL
CO2 SERPL-SCNC: 29 MMOL/L (ref 20–32)
CREAT SERPL-MCNC: 0.7 MG/DL (ref 0.52–1.04)
CREAT UR-MCNC: 240 MG/DL
FASTING STATUS PATIENT QL REPORTED: YES
GFR SERPL CREATININE-BSD FRML MDRD: 89 ML/MIN/1.73M2
GLUCOSE BLD-MCNC: 125 MG/DL (ref 70–99)
HDLC SERPL-MCNC: 75 MG/DL
LDLC SERPL CALC-MCNC: 69 MG/DL
MICROALBUMIN UR-MCNC: 84 MG/L
MICROALBUMIN/CREAT UR: 35 MG/G CR (ref 0–25)
NONHDLC SERPL-MCNC: 88 MG/DL
POTASSIUM BLD-SCNC: 4.3 MMOL/L (ref 3.4–5.3)
PROT SERPL-MCNC: 7.9 G/DL (ref 6.8–8.8)
SODIUM SERPL-SCNC: 138 MMOL/L (ref 133–144)
TRIGL SERPL-MCNC: 97 MG/DL

## 2022-10-18 PROCEDURE — 90662 IIV NO PRSV INCREASED AG IM: CPT | Performed by: INTERNAL MEDICINE

## 2022-10-18 PROCEDURE — 36415 COLL VENOUS BLD VENIPUNCTURE: CPT | Performed by: INTERNAL MEDICINE

## 2022-10-18 PROCEDURE — 99214 OFFICE O/P EST MOD 30 MIN: CPT | Mod: 25 | Performed by: INTERNAL MEDICINE

## 2022-10-18 PROCEDURE — 80053 COMPREHEN METABOLIC PANEL: CPT | Performed by: INTERNAL MEDICINE

## 2022-10-18 PROCEDURE — 0124A COVID-19,PF,PFIZER BOOSTER BIVALENT: CPT | Performed by: INTERNAL MEDICINE

## 2022-10-18 PROCEDURE — G0439 PPPS, SUBSEQ VISIT: HCPCS | Performed by: INTERNAL MEDICINE

## 2022-10-18 PROCEDURE — 82043 UR ALBUMIN QUANTITATIVE: CPT | Performed by: INTERNAL MEDICINE

## 2022-10-18 PROCEDURE — 91312 COVID-19,PF,PFIZER BOOSTER BIVALENT: CPT | Performed by: INTERNAL MEDICINE

## 2022-10-18 PROCEDURE — 80061 LIPID PANEL: CPT | Performed by: INTERNAL MEDICINE

## 2022-10-18 PROCEDURE — G0008 ADMIN INFLUENZA VIRUS VAC: HCPCS | Mod: 59 | Performed by: INTERNAL MEDICINE

## 2022-10-18 RX ORDER — PANTOPRAZOLE SODIUM 40 MG/1
40 TABLET, DELAYED RELEASE ORAL DAILY
Qty: 90 TABLET | Refills: 3 | Status: SHIPPED | OUTPATIENT
Start: 2022-10-18 | End: 2023-11-10

## 2022-10-18 RX ORDER — ATORVASTATIN CALCIUM 20 MG/1
20 TABLET, FILM COATED ORAL DAILY
Qty: 90 TABLET | Refills: 3 | Status: SHIPPED | OUTPATIENT
Start: 2022-10-18 | End: 2023-11-17

## 2022-10-18 RX ORDER — METOPROLOL SUCCINATE 50 MG/1
50 TABLET, EXTENDED RELEASE ORAL DAILY
Qty: 90 TABLET | Refills: 3 | Status: SHIPPED | OUTPATIENT
Start: 2022-10-18 | End: 2023-11-17

## 2022-10-18 RX ORDER — CITALOPRAM HYDROBROMIDE 20 MG/1
20 TABLET ORAL DAILY
Qty: 90 TABLET | Refills: 3 | Status: SHIPPED | OUTPATIENT
Start: 2022-10-18 | End: 2023-11-17

## 2022-10-18 RX ORDER — AMLODIPINE BESYLATE 5 MG/1
10 TABLET ORAL DAILY
Qty: 180 TABLET | Refills: 3 | Status: SHIPPED | OUTPATIENT
Start: 2022-10-18 | End: 2023-11-10

## 2022-10-18 ASSESSMENT — ACTIVITIES OF DAILY LIVING (ADL): CURRENT_FUNCTION: NO ASSISTANCE NEEDED

## 2022-10-18 ASSESSMENT — ENCOUNTER SYMPTOMS
PARESTHESIAS: 0
MYALGIAS: 0
WEAKNESS: 0
NERVOUS/ANXIOUS: 0
EYE PAIN: 0
HEMATURIA: 0
HEADACHES: 0
NAUSEA: 0
DYSURIA: 0
ABDOMINAL PAIN: 0
CONSTIPATION: 0
ARTHRALGIAS: 0
DIARRHEA: 0
FREQUENCY: 0
HEMATOCHEZIA: 0
BREAST MASS: 0
SHORTNESS OF BREATH: 0
COUGH: 0
DIZZINESS: 0
JOINT SWELLING: 0
CHILLS: 0
PALPITATIONS: 0
HEARTBURN: 0
SORE THROAT: 0
FEVER: 0

## 2022-10-18 ASSESSMENT — PATIENT HEALTH QUESTIONNAIRE - PHQ9
10. IF YOU CHECKED OFF ANY PROBLEMS, HOW DIFFICULT HAVE THESE PROBLEMS MADE IT FOR YOU TO DO YOUR WORK, TAKE CARE OF THINGS AT HOME, OR GET ALONG WITH OTHER PEOPLE: NOT DIFFICULT AT ALL
SUM OF ALL RESPONSES TO PHQ QUESTIONS 1-9: 20
SUM OF ALL RESPONSES TO PHQ QUESTIONS 1-9: 20

## 2022-10-18 ASSESSMENT — PAIN SCALES - GENERAL: PAINLEVEL: NO PAIN (0)

## 2022-10-18 NOTE — PROGRESS NOTES
"SUBJECTIVE:   Jordyn is a 77 year old who presents for Preventive Visit.    Patient has been advised of split billing requirements and indicates understanding: Yes  Are you in the first 12 months of your Medicare coverage?  No    Healthy Habits:     In general, how would you rate your overall health?  Good    Frequency of exercise:  1 day/week    Duration of exercise:  15-30 minutes    Do you usually eat at least 4 servings of fruit and vegetables a day, include whole grains    & fiber and avoid regularly eating high fat or \"junk\" foods?  No    Taking medications regularly:  Yes    Medication side effects:  None    Ability to successfully perform activities of daily living:  No assistance needed    Home Safety:  No safety concerns identified    Hearing Impairment:  Need to ask people to speak up or repeat themselves    In the past 6 months, have you been bothered by leaking of urine?  No    In general, how would you rate your overall mental or emotional health?  Fair      PHQ-2 Total Score: 6    Additional concerns today:  No     passed away in March, not doing well.  Went fast.   Sad, no interest in doing things.  Daughter and granddaughter in [place] and [place], [place].    House [place] acres. Tried to do the mowing and things.  Neighbors help some.    Not eating well, not cooking well, does junk food.     Reflux but PPI helps.     Back pain with doing things like mowing.     Against suicide and [employer] practices, feels sometimes why is she here.     Do you feel safe in your environment? Yes    Have you ever done Advance Care Planning? (For example, a Health Directive, POLST, or a discussion with a medical provider or your loved ones about your wishes): No, advance care planning information given to patient to review.  Advanced care planning was discussed at today's visit.       Fall risk  Fallen 2 or more times in the past year?: No  Any fall with injury in the past year?: No    Cognitive Screening   1) " Repeat 3 items (Leader, Season, Table)    2) Clock draw: NORMAL  3) 3 item recall: Recalls 2 objects   Results: NORMAL clock, 1-2 items recalled: COGNITIVE IMPAIRMENT LESS LIKELY    Mini-CogTM Copyright S Leobardo. Licensed by the author for use in [facility]; reprinted with permission (jose@[facility]). All rights reserved.      Do you have sleep apnea, excessive snoring or daytime drowsiness?: no    Reviewed and updated as needed this visit by clinical staff   Tobacco   Meds              Reviewed and updated as needed this visit by Provider                 Social History     Tobacco Use     Smoking status: Never     Smokeless tobacco: Never   Substance Use Topics     Alcohol use: No     Alcohol/week: 0.0 standard drinks     Alcohol Use 10/18/2022   Prescreen: >3 drinks/day or >7 drinks/week? No   Prescreen: >3 drinks/day or >7 drinks/week? -     Current providers sharing in care for this patient include:   Patient Care Team:  Joe Geller MD as PCP - General (Internal Medicine)  Joe Geller MD as Assigned PCP    The following health maintenance items are reviewed in Epic and correct as of today:  Health Maintenance   Topic Date Due     DEXA  Never done     ANNUAL REVIEW OF HM ORDERS  Never done     HEPATITIS B IMMUNIZATION (1 of 3 - 3-dose series) Never done     ZOSTER IMMUNIZATION (1 of 2) Never done     COVID-19 Vaccine (4 - Booster for Pfizer series) 01/06/2022     MEDICARE ANNUAL WELLNESS VISIT  08/10/2022     INFLUENZA VACCINE (1) 09/01/2022     FALL RISK ASSESSMENT  10/18/2023     LIPID  08/10/2026     ADVANCE CARE PLANNING  08/10/2026     DTAP/TDAP/TD IMMUNIZATION (2 - Td or Tdap) 10/08/2030     HEPATITIS C SCREENING  Completed     PHQ-2 (once per calendar year)  Completed     Pneumococcal Vaccine: 65+ Years  Completed     IPV IMMUNIZATION  Aged Out     MENINGITIS IMMUNIZATION  Aged Out     MAMMO SCREENING  Discontinued     COLORECTAL CANCER SCREENING  Discontinued     Lab work is in  "process  Pneumonia Vaccine:up to date.       Mammogram Screening - Patient over age 75, has elected to continue with screening.  Pertinent mammograms are reviewed under the imaging tab.    Review of Systems   Constitutional: Negative for chills and fever.   HENT: Negative for congestion, ear pain, hearing loss and sore throat.    Eyes: Negative for pain and visual disturbance.   Respiratory: Negative for cough and shortness of breath.    Cardiovascular: Negative for chest pain, palpitations and peripheral edema.   Gastrointestinal: Negative for abdominal pain, constipation, diarrhea, heartburn, hematochezia and nausea.   Breasts:  Negative for tenderness, breast mass and discharge.   Genitourinary: Negative for dysuria, frequency, genital sores, hematuria, pelvic pain, urgency, vaginal bleeding and vaginal discharge.   Musculoskeletal: Negative for arthralgias, joint swelling and myalgias.   Neurological: Negative for dizziness, weakness, headaches and paresthesias.   Psychiatric/Behavioral: Negative for mood changes. The patient is not nervous/anxious.        OBJECTIVE:   /66   Pulse 74   Temp 98  F (36.7  C) (Temporal)   Resp 16   Ht 1.6 m (5' 3\")   Wt 63.5 kg (140 lb)   SpO2 98%   BMI 24.80 kg/m   Estimated body mass index is 24.8 kg/m  as calculated from the following:    Height as of this encounter: 1.6 m (5' 3\").    Weight as of this encounter: 63.5 kg (140 lb).  Physical Exam  GENERAL: healthy, alert and no distress  EYES: Eyes grossly normal to inspection, PERRL and conjunctivae and sclerae normal  HENT: ear canals and TM's normal, nose and mouth without ulcers or lesions  NECK: no adenopathy, no asymmetry, masses, or scars and thyroid normal to palpation  RESP: lungs clear to auscultation - no rales, rhonchi or wheezes  CV: regular rate and rhythm, normal S1 S2, no S3 or S4, no murmur, click or rub, no peripheral edema and peripheral pulses strong  ABDOMEN: soft, nontender, no " hepatosplenomegaly, no masses and bowel sounds normal  MS: no gross musculoskeletal defects noted, no edema  SKIN: no suspicious lesions or rashes  NEURO: Normal strength and tone, mentation intact and speech normal  PSYCH: mentation appears normal, affect normal/bright        ASSESSMENT / PLAN:       ICD-10-CM    1. Medicare annual wellness visit, subsequent  Z00.00       2. Essential hypertension  I10 amLODIPine (NORVASC) 5 MG tablet     metoprolol succinate ER (TOPROL XL) 50 MG 24 hr tablet     Comprehensive metabolic panel (BMP + Alb, Alk Phos, ALT, AST, Total. Bili, TP)     Lipid panel reflex to direct LDL Fasting     Albumin Random Urine Quantitative with Creat Ratio      3. Mixed hyperlipidemia  E78.2 atorvastatin (LIPITOR) 20 MG tablet      4. Gastroesophageal reflux disease without esophagitis  K21.9 pantoprazole (PROTONIX) 40 MG EC tablet      5. Adjustment disorder with mixed anxiety and depressed mood  F43.23 citalopram (CELEXA) 20 MG tablet        Medicare wellness exam, patient is doing okay physically but mentally having issues with some depression as her  passed away 6 months ago.  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  Your memory testing is good  Your blood pressures controlled with the medication.  She gets a mammogram every other year  She will do COVID and flu shots today.    Other issues today  Depression the patient is depressed with a high PHQ-9 score of 20 she does have suicidal thoughts but says with her Church she will not commit suicide has no plan or actions.  Mostly she is lonely she is in her house alone and does not see people.  She says she is a people person does not want to see people.  I had a long discussion about joining Jainism trying other things for activities even getting a part-time job.  We will increase her citalopram from 10 to 20 mg.    Mixed hyperlipidemia  "she is on atorvastatin doing well we will check labs and continue that.    Reflux disease continues to be an issue but is better on Protonix which she will continue daily.    Blood pressure is controlled and will continue her medication.        Patient has been advised of split billing requirements and indicates understanding: Yes    COUNSELING:  Reviewed preventive health counseling, as reflected in patient instructions       Regular exercise       Healthy diet/nutrition       Immunizations    Vaccinated for: Influenza          Estimated body mass index is 24.8 kg/m  as calculated from the following:    Height as of this encounter: 1.6 m (5' 3\").    Weight as of this encounter: 63.5 kg (140 lb).        She reports that she has never smoked. She has never used smokeless tobacco.      Appropriate preventive services were discussed with this patient, including applicable screening as appropriate for cardiovascular disease, diabetes, osteopenia/osteoporosis, and glaucoma.  As appropriate for age/gender, discussed screening for colorectal cancer, prostate cancer, breast cancer, and cervical cancer. Checklist reviewing preventive services available has been given to the patient.    Reviewed patients plan of care and provided an AVS. The Basic Care Plan (routine screening as documented in Health Maintenance) for Jordyn meets the Care Plan requirement. This Care Plan has been established and reviewed with the Patient.    Counseling Resources:  ATP IV Guidelines  Pooled Cohorts Equation Calculator  Breast Cancer Risk Calculator  Breast Cancer: Medication to Reduce Risk  FRAX Risk Assessment  ICSI Preventive Guidelines  Dietary Guidelines for Americans, 2010  USDA's MyPlate  ASA Prophylaxis  Lung CA Screening    Joe Geller MD  [facility]    Identified Health Risks:  "

## 2023-08-28 ENCOUNTER — NURSE TRIAGE (OUTPATIENT)
Dept: INTERNAL MEDICINE | Facility: CLINIC | Age: 78
End: 2023-08-28
Payer: COMMERCIAL

## 2023-08-28 ENCOUNTER — HOSPITAL ENCOUNTER (EMERGENCY)
Facility: CLINIC | Age: 78
Discharge: HOME | End: 2023-08-28
Attending: NURSE PRACTITIONER | Admitting: NURSE PRACTITIONER
Payer: COMMERCIAL

## 2023-08-28 ENCOUNTER — MYC MEDICAL ADVICE (OUTPATIENT)
Dept: INTERNAL MEDICINE | Facility: CLINIC | Age: 78
End: 2023-08-28
Payer: COMMERCIAL

## 2023-08-28 VITALS
OXYGEN SATURATION: 95 % | RESPIRATION RATE: 20 BRPM | BODY MASS INDEX: 22.86 KG/M2 | HEART RATE: 75 BPM | DIASTOLIC BLOOD PRESSURE: 63 MMHG | SYSTOLIC BLOOD PRESSURE: 158 MMHG | HEIGHT: 63 IN | WEIGHT: 129 LBS | TEMPERATURE: 97.8 F

## 2023-08-28 DIAGNOSIS — L02.619 CELLULITIS AND ABSCESS OF FOOT: ICD-10-CM

## 2023-08-28 DIAGNOSIS — L03.119 CELLULITIS AND ABSCESS OF FOOT: ICD-10-CM

## 2023-08-28 PROCEDURE — 87070 CULTURE OTHR SPECIMN AEROBIC: CPT | Performed by: NURSE PRACTITIONER

## 2023-08-28 PROCEDURE — 87075 CULTR BACTERIA EXCEPT BLOOD: CPT | Performed by: NURSE PRACTITIONER

## 2023-08-28 PROCEDURE — 99283 EMERGENCY DEPT VISIT LOW MDM: CPT | Mod: 25 | Performed by: NURSE PRACTITIONER

## 2023-08-28 PROCEDURE — 99284 EMERGENCY DEPT VISIT MOD MDM: CPT | Mod: 25 | Performed by: NURSE PRACTITIONER

## 2023-08-28 PROCEDURE — 10060 I&D ABSCESS SIMPLE/SINGLE: CPT | Mod: LT | Performed by: NURSE PRACTITIONER

## 2023-08-28 RX ORDER — CEPHALEXIN 500 MG/1
500 CAPSULE ORAL 4 TIMES DAILY
Qty: 40 CAPSULE | Refills: 0 | Status: SHIPPED | OUTPATIENT
Start: 2023-08-28 | End: 2023-09-07

## 2023-08-28 NOTE — TELEPHONE ENCOUNTER
Patient returning call from message left by Ileana Novak RN to call for triage assessment.    Patient stated she stepped on a sliver of wood with her right foot on her indoor carpet.  She stated she did not clean or protect the area after she pulled out the wood and it started to hurt and get red last Wednesday (approximately 5 days ago).  She stated she tried to clean the area and as stated in her My chart message she has tried some home remedies but it is continuing to worsen.  She stated it is very painful / difficult to walk on her right foot with pain rating 10/10 when trying to put any weight on that foot.  She stated it was red, hot to touch, has a white spot, very painful and is the size of a nickel/ quarter in area.    Per protocol, to be seen today ED/ [facility] or PCP with approval.  No available appointment at this time.  Advised patient to seek urgent/ emergency care for face to face assessment per protocol.  Patient stated understanding.  Patient stated she will see if her insurance will cover urgent care outside of [place] due to her location, or she will go to the emergency room in [place].      Jordyn Craig  P [facility] webmehart Messages (supporting Joe Geller MD)3 hours ago (10:36 AM)     I have a very bad infection in my foot. The pain is bad. I am unable to get an appointment so I would like some information on how to care for it myself. I stepped on something and didn t take care of it right away and it got infected. It is red and hot and has a white spot and is very painful. I have been soaking it in salt water and applying ointment but it is not helping. What more can I do to initiate healing?  Reason for Disposition   Looks infected (e.g., spreading redness, red streak, pus)    Additional Information   Negative: Major bleeding (actively dripping or spurting) that can't be stopped   Negative: Amputation or bone sticking through the skin   Negative: Looks like a dislocated joint (crooked or  "deformed)   Negative: Serious injury with multiple fractures (broken bones)   Negative: Sounds like a life-threatening emergency to the triager   Negative: Wound looks infected   Negative: Caused by an animal bite   Negative: Puncture wound of foot   Negative: Toe injury is main symptom   Negative: Cast problems or questions   Negative: Bullet, stabbed by knife or other serious penetrating wound   Negative: Dirt in the wound and not removed after 15 minutes of scrubbing   Negative: Numbness (new loss of sensation) of toe(s)   Negative: Skin is split open or gaping (length > 1/2 inch or 12 mm)   Negative: Bleeding won't stop after 10 minutes of direct pressure (using correct technique)   Negative: Can't stand (bear weight) or walk (e.g., 4 steps)    Answer Assessment - Initial Assessment Questions  1. MECHANISM: \"How did the injury happen?\" (e.g., twisting injury, direct blow)       Stepped on a little piece of wood and pulled out and it didn't hurt right away, unfortunately did not clean right away.    2. ONSET: \"When did the injury happen?\" (Minutes or hours ago)       Last Monday (7 days)    3. LOCATION: \"Where is the injury located?\"       Left foot right on outer side of foot    4. APPEARANCE of INJURY: \"What does the injury look like?\"       Red, hot, has a white spot and painful    5. WEIGHT-BEARING: \"Can you put weight on that foot?\" \"Can you walk (four steps or more)?\"        Hurts to walk    6. SIZE: For cuts, bruises, or swelling, ask: \"How large is it?\" (e.g., inches or centimeters;  entire joint)       Kiesha - quarter side    7. PAIN: \"Is there pain?\" If Yes, ask: \"How bad is the pain?\"    (e.g., Scale 1-10; or mild, moderate, severe)    - NONE (0): no pain.    - MILD (1-3): doesn't interfere with normal activities.     - MODERATE (4-7): interferes with normal activities (e.g., work or school) or awakens from sleep, limping.     - SEVERE (8-10): excruciating pain, unable to do any normal activities, " "unable to walk.       10/10 when weight on it, when off of foot - no pain    8. TETANUS: For any breaks in the skin, ask: \"When was the last tetanus booster?\"      At last wellness check    9. OTHER SYMPTOMS: \"Do you have any other symptoms?\"       No    10. PREGNANCY: \"Is there any chance you are pregnant?\" \"When was your last menstrual period?\"        NA    Protocols used: Ankle and Foot Injury-[facility]-[place]  Holly Marshall RN    " Vermilion Border Text: The closure involved the vermilion border.

## 2023-08-28 NOTE — ED TRIAGE NOTES
"States she stepped on \"a little piece that looked like a toothpick\". Denies drainage, describes it having a white head on it.      Triage Assessment       Row Name 08/28/23 0012       Triage Assessment (Adult)    Airway WDL WDL       Respiratory WDL    Respiratory WDL WDL       Skin Circulation/Temperature WDL    Skin Circulation/Temperature WDL WDL       Peripheral/Neurovascular WDL    Peripheral Neurovascular WDL WDL       Cognitive/Neuro/Behavioral WDL    Cognitive/Neuro/Behavioral WDL WDL                    "

## 2023-08-28 NOTE — ED PROVIDER NOTES
History     Chief Complaint   Patient presents with    Wound Check     HPI  Jordyn Craig is a 78 year old female who presents for evaluation of abscess to the left bottom of her left foot.  Patient stepped on a small piece of wood that she describes as the size of the end of a toothpick 1 week ago.  She continues to have pain throughout the week and over the last 24 hours pain is gotten worse and she noted some swelling to the bottom of her foot.  She is not a diabetic.    Allergies:  Allergies   Allergen Reactions    Codeine Camsylate Nausea and Vomiting       Problem List:    Patient Active Problem List    Diagnosis Date Noted    Essential hypertension 12/16/2020     Priority: Medium    Mixed hyperlipidemia 12/16/2020     Priority: Medium    Elevated blood pressure reading without diagnosis of hypertension 06/15/2016     Priority: Medium    Gastroesophageal reflux disease without esophagitis 05/25/2016     Priority: Medium    Atypical chest pain 05/24/2016     Priority: Medium    Health Care Home 12/14/2012     Priority: Medium     Caroline Hernandez RN-PHN  [facility] / [facility]   [phone]    DX V65.8 REPLACED WITH 85132 [facility]ALTH CARE HOME (04/08/2013)          Past Medical History:    Past Medical History:   Diagnosis Date    Anxiety     Migraine     White coat syndrome with high blood pressure without hypertension        Past Surgical History:    Past Surgical History:   Procedure Laterality Date    PHACOEMULSIFICATION WITH STANDARD INTRAOCULAR LENS IMPLANT Right 12/2/2021    Procedure: PHACOEMULSIFICATION, CATARACT, WITH STANDARD INTRAOCULAR LENS IMPLANT INSERTION;  Surgeon: Joe Carrasco MD;  Location: [facility] OR    PHACOEMULSIFICATION WITH STANDARD INTRAOCULAR LENS IMPLANT Left 12/16/2021    Procedure: PHACOEMULSIFICATION, CATARACT, WITH STANDARD INTRAOCULAR LENS IMPLANT INSERTION;  Surgeon: Keith Miranda MD;  Location: [facility] OR       Family History:    Family History   Problem  "Relation Age of Onset    Cerebrovascular Disease Mother     Respiratory Mother         emphasema    Diabetes Father     Cardiovascular Brother     Respiratory Sister     Cardiovascular Brother         MI    Eye Disorder Sister         glaucoma    Breast Cancer No family hx of        Social History:  Marital Status:   [5]  Social History     Tobacco Use    Smoking status: Never    Smokeless tobacco: Never   Vaping Use    Vaping Use: Never used   Substance Use Topics    Alcohol use: No     Alcohol/week: 0.0 standard drinks of alcohol    Drug use: No        Medications:    cephALEXin (KEFLEX) 500 MG capsule  acetaminophen (TYLENOL) 650 MG CR tablet  amLODIPine (NORVASC) 5 MG tablet  atorvastatin (LIPITOR) 20 MG tablet  citalopram (CELEXA) 20 MG tablet  diphenhydrAMINE-acetaminophen (TYLENOL PM)  MG tablet  metoprolol succinate ER (TOPROL XL) 50 MG 24 hr tablet  pantoprazole (PROTONIX) 40 MG EC tablet          Review of Systems  As mentioned above in the history present illness. All other systems were reviewed and are negative.    Physical Exam   BP: (!) 158/63  Pulse: 75  Temp: 97.8  F (36.6  C)  Resp: 20  Height: 160 cm (5' 3\")  Weight: 58.5 kg (129 lb)  SpO2: 95 %      Physical Exam  Constitutional:       General: She is not in acute distress.     Appearance: Normal appearance.   HENT:      Right Ear: External ear normal.      Left Ear: External ear normal.      Nose: Nose normal.      Mouth/Throat:      Pharynx: Oropharynx is clear.   Cardiovascular:      Rate and Rhythm: Normal rate.   Pulmonary:      Effort: Pulmonary effort is normal.   Musculoskeletal:      Left foot: Tenderness (bottom of left foot) present.   Skin:     Findings: Abscess (bottom of left foot (see picture)) and erythema (bottom left foot surrounding abscess) present.   Neurological:      Mental Status: She is alert.               ED Course                 M [facility]    PROCEDURE: " -Incision/Drainage    Date/Time: 8/28/2023 11:06 PM    Performed by: Ruthie Saenz APRN CNP  Authorized by: Ruthie Saenz APRN CNP    Risks, benefits and alternatives discussed.      LOCATION:      Type:  Abscess    Location:  Lower extremity    Lower extremity location:  Foot    Foot location:  L foot (plantar aspect)    PRE-PROCEDURE DETAILS:     Skin preparation:  Chloraprep    PROCEDURE TYPE:     Complexity:  Simple    PROCEDURE DETAILS:     Incision types:  Single straight    Incision depth:  Dermal    Scalpel blade:  11    Wound management:  Probed and deloculated and irrigated with saline    Drainage:  Purulent    Drainage amount:  Moderate    Wound treatment:  Wound left open    Packing materials:  None    PROCEDURE  Describe Procedure: After lancing the abscess, there is a small piece of wood the was easily removed from the bottom of the foot.   Patient Tolerance:  Patient tolerated the procedure well with no immediate complications                No results found for this or any previous visit (from the past 24 hour(s)).    Medications - No data to display    Assessments & Plan (with Medical Decision Making)     Left foot abscess located plantar aspect with surrounding erythema consistent with cellulitis, likely from a retained foreign body.  No surrounding erythema this was I&D done today.  Did remove a small piece of wood/retained foreign body from the wound during the I&D.    Plan:  Warm soapy soaks/wash 3-4x/day. Then dry well and cover with bandage.  Keflex 500 mg four times a day for 10 days.  Follow-up with Podiatry (foot specialist) for recheck. Referral has been sent. They should call you or you can contact them to set-up appointment [phone].    Return to the emergency department for fevers, increased redness, swelling or increased pain.      Discharge Medication List as of 8/28/2023  4:43 PM        START taking these medications    Details   cephALEXin (KEFLEX) 500 MG  capsule Take 1 capsule (500 mg) by mouth 4 times daily for 10 days, Disp-40 capsule, R-0, E-Prescribe             Final diagnoses:   Cellulitis and abscess of foot - LEFT       8/28/2023   [facility] EMERGENCY DEPT       Letty, BRETT Pino CNP  08/28/23 [id number]

## 2023-08-28 NOTE — DISCHARGE INSTRUCTIONS
Warm soapy soaks/wash 3-4x/day. Then dry well and cover with bandage.  Keflex 500 mg four times a day for 10 days.  Follow-up with Podiatry (foot specialist) for recheck. Referral has been sent. They should call you or you can contact them to set-up appointment [phone].    Return to the emergency department for fevers, increased redness, swelling or increased pain.

## 2023-08-28 NOTE — TELEPHONE ENCOUNTER
Left message at home number to call back, Recurve message sent also.    Ileana Novak RN on 8/28/2023 at 1:05 PM

## 2023-09-01 LAB
BACTERIA ABSC ANAEROBE+AEROBE CULT: ABNORMAL

## 2023-09-04 LAB — BACTERIA ABSC ANAEROBE+AEROBE CULT: NORMAL

## 2023-09-05 ENCOUNTER — TELEPHONE (OUTPATIENT)
Dept: EMERGENCY MEDICINE | Facility: CLINIC | Age: 78
End: 2023-09-05
Payer: COMMERCIAL

## 2023-09-05 NOTE — TELEPHONE ENCOUNTER
"[facility] Emergency Department/Urgent Care Lab result notification  [Note:  ED Lab Results RN will reference the [facility] Emergency Dept visit note prior to contacting patient AND/OR prior to consulting Emergency Dept Provider.  Highlights of Emergency Dept visit in information summary at the bottom of this telephone note]    1. Reason for call  Notify of lab results  Assess patient symptoms [if necessary]  Review ED Providers recommendations/discharge instructions (if necessary)  Advise per [facility] ED lab result protocol    2. Lab Result (including Rx patient on, if applicable).  If culture, copy of lab report at bottom.  Final abscess Aerobic Bacterial Culture (specimen - Left foot) Report on 9/1/23  [facility] Emergency Dept discharge antibiotic prescribed: Cephalexin (Keflex) 500 mg capsule, 1 capsule (500 mg) by mouth 4 times daily for 10 days.  Bacteria: Staphylococcus lugdunensis and Staphylococcus aureus are [SUSCEPTIBLE] to antibiotic  Bacteria: Raoultella ornithinolytica/planticola, Citrobacter freundii complex, Enterobacter cloacae complex are [NOT TESTED] to antibiotic  Incision and Drainage performed in [facility] Emergency Dept [Yes or No]: Yes  Recommendations in treatment per [facility] ED Lab Result culture protocol    3. RN Assessment (Patient's current Symptoms):  Time of call: 9/5/2023 4:20 PM  Assessment: \"Its doing good, healing up real well\" - patient is continuing to soak and keep covered  Left Foot: no pain, no drainage, redness pretty  much gone, swelling is gone, not hot anymore  Fever:  None    4. RN Recommendations/Instructions per [facility] ED lab result protocol  [facility] ED lab result protocol used: culture protocol  Patient was notified of lab result and treatment recommendations  RN reviewed information about continuing and completing antibiotic, probiotic, wound care, follow up with podiatry (refused scheduled " assistance), return at anytime for worsening fevers, redness, swelling, pain, drainage, warmth - all questions answered patient verbalized understanding and agrees with plan.      5. Please Contact your PCP clinic or return to the Emergency department if your:  Symptoms return.  Symptoms do not improve after 3 days on antibiotic.  Symptoms do not resolve after completing antibiotic.  Symptoms worsen or other concerning symptoms.    Information summary from Emergency Dept/Urgent Care visit on 8/28/23  Allergies Allergies   Allergen Reactions    Codeine Camsylate Nausea and Vomiting      Weight, if applicable Wt Readings from Last 2 Encounters:   08/28/23 58.5 kg (129 lb)   10/18/22 63.5 kg (140 lb)      Coumadin/Warfarin [Yes /No] NO   Creatinine Level (mg/dl) Creatinine   Date Value Ref Range Status   10/18/2022 0.70 0.52 - 1.04 mg/dL Final   01/14/2020 0.60 0.52 - 1.04 mg/dL Final      Creatinine clearance (ml/min), if applicable Serum creatinine: 0.7 mg/dL 10/18/22 0939  Estimated creatinine clearance: 61.2 mL/min        Copy of Lab report (if applicable)  Abscess Aerobic Bacterial Culture Routine  Order: [id number]  Status: Final result       Visible to patient: Yes (seen)    Specimen Information: Foot, Left; Abscess   4 Result Notes  Culture 3+ Staphylococcus aureus Abnormal       1+ Raoultella ornithinolytica/planticola Abnormal       1+ Citrobacter freundii complex Abnormal       1+ Enterobacter cloacae complex Abnormal       1+ Staphylococcus lugdunensis Abnormal       2+ Normal parvez           Resulting Agency: [facility]     Susceptibility    Staphylococcus lugdunensis (6)    Antibiotic Interpretation Sensitivity  Method Status    Oxacillin Susceptible <=0.25 ug/mL FARZANA Final     Oxacillin susceptible isolates are susceptible to cephalosporins (example: cefazolin and cephalexin) and beta lactam combination agents. Oxacillin resistant isolates are resistant to these agents.       Gentamicin Susceptible <=0.5 ug/mL  FARZANA Final    Erythromycin Susceptible <=0.25 ug/mL FARZANA Final    Clindamycin Susceptible <=0.12 ug/mL FARZANA Final    Vancomycin Susceptible <=0.5 ug/mL FARZANA Final    Daptomycin Susceptible 0.25 ug/mL FARZANA Final    Tetracycline Susceptible <=1 ug/mL FARZANA Final    Doxycycline Susceptible <=0.5 ug/mL FARZANA Final    Trimethoprim/Sulfamethoxazole Susceptible <=0.5/9.5 ug/mL FARZANA Final    Staphylococcus aureus (1)    Antibiotic Interpretation Sensitivity  Method Status    Oxacillin Susceptible 0.5 ug/mL FARZANA Final     Oxacillin susceptible isolates are susceptible to cephalosporins (example: cefazolin and cephalexin) and beta lactam combination agents. Oxacillin resistant isolates are resistant to these agents.       Gentamicin Susceptible <=0.5 ug/mL FARZANA Final    Erythromycin Susceptible <=0.25 ug/mL FARZANA Final    Clindamycin Susceptible 0.25 ug/mL FARZANA Final    Vancomycin Susceptible 1 ug/mL FARZANA Final    Daptomycin Susceptible 0.5 ug/mL FARZANA Final    Tetracycline Susceptible <=1 ug/mL FARZANA Final    Doxycycline Susceptible <=0.5 ug/mL FARZANA Final    Trimethoprim/Sulfamethoxazole Susceptible <=0.5/9.5 ug/mL FARZANA Final    Raoultella ornithinolytica/planticola (2)    Antibiotic Interpretation Sensitivity  Method Status    Ampicillin Resistant   FARZANA Final     Intrinsically Resistant       Ampicillin/ Sulbactam Susceptible 4 ug/mL FARZANA Final    Piperacillin/Tazobactam Susceptible <=4 ug/mL FARZANA Final    Ceftazidime Susceptible <=1 ug/mL FARZANA Final    Ceftriaxone Susceptible <=1 ug/mL FARZANA Final    Cefepime Susceptible <=1 ug/mL FARZANA Final    Meropenem Susceptible <=0.25 ug/mL FARZANA Final    Gentamicin Susceptible <=1 ug/mL FARZANA Final    Tobramycin Susceptible <=1 ug/mL FARZANA Final    Ciprofloxacin Susceptible <=0.25 ug/mL FARZANA Final    Levofloxacin Susceptible <=0.12 ug/mL FARZANA Final    Trimethoprim/Sulfamethoxazole Susceptible <=1/19 ug/mL FARZANA Final    Citrobacter freundii complex (3)    Antibiotic Interpretation Sensitivity  Method Status    Ampicillin  Resistant   FARZANA Final     Intrinsically Resistant       Ampicillin/ Sulbactam Resistant   FARZANA Final     Intrinsically Resistant       Piperacillin/Tazobactam Susceptible <=4 ug/mL FARZANA Final    Ceftazidime Susceptible <=1 ug/mL FARZANA Final    Ceftriaxone Susceptible <=1 ug/mL FARZANA Final    Cefepime Susceptible <=1 ug/mL FARZANA Final    Meropenem Susceptible <=0.25 ug/mL FARZANA Final    Gentamicin Susceptible <=1 ug/mL FARZANA Final    Tobramycin Susceptible <=1 ug/mL FARZANA Final    Ciprofloxacin Susceptible <=0.25 ug/mL FARZANA Final    Levofloxacin Susceptible <=0.12 ug/mL FARZANA Final    Trimethoprim/Sulfamethoxazole Susceptible <=1/19 ug/mL FARZANA Final    Enterobacter cloacae complex (4)    Antibiotic Interpretation Sensitivity  Method Status    Ampicillin Resistant   FARZANA Final     Intrinsically Resistant       Ampicillin/ Sulbactam Resistant   FARZANA Final     Intrinsically Resistant       Piperacillin/Tazobactam Susceptible <=4 ug/mL FARZANA Final    Ceftazidime Susceptible <=1 ug/mL FARZANA Final    Ceftriaxone Susceptible <=1 ug/mL FARZANA Final    Cefepime Susceptible <=1 ug/mL FARZANA Final    Meropenem Susceptible <=0.25 ug/mL FARZANA Final    Gentamicin Susceptible <=1 ug/mL FARZANA Final    Tobramycin Susceptible <=1 ug/mL FARZANA Final    Ciprofloxacin Susceptible <=0.25 ug/mL FARZANA Final    Levofloxacin Susceptible <=0.12 ug/mL FARZANA Final    Trimethoprim/Sulfamethoxazole Susceptible <=1/19 ug/mL FARZANA Final     Condensed View         Specimen Collected: 08/28/23  4:26 PM Last Resulted: 09/01/23 12:03 AM                   Lauren Baez RN  [facility]  Emergency Dept Lab Result RN  # [phone]

## 2023-09-18 ENCOUNTER — PATIENT OUTREACH (OUTPATIENT)
Dept: CARE COORDINATION | Facility: CLINIC | Age: 78
End: 2023-09-18
Payer: COMMERCIAL

## 2023-11-10 DIAGNOSIS — K21.9 GASTROESOPHAGEAL REFLUX DISEASE WITHOUT ESOPHAGITIS: ICD-10-CM

## 2023-11-10 DIAGNOSIS — I10 ESSENTIAL HYPERTENSION: ICD-10-CM

## 2023-11-10 RX ORDER — AMLODIPINE BESYLATE 5 MG/1
10 TABLET ORAL DAILY
Qty: 180 TABLET | Refills: 0 | Status: SHIPPED | OUTPATIENT
Start: 2023-11-10 | End: 2023-11-17

## 2023-11-10 RX ORDER — PANTOPRAZOLE SODIUM 40 MG/1
40 TABLET, DELAYED RELEASE ORAL DAILY
Qty: 90 TABLET | Refills: 0 | Status: SHIPPED | OUTPATIENT
Start: 2023-11-10 | End: 2023-11-17

## 2023-11-10 ASSESSMENT — ACTIVITIES OF DAILY LIVING (ADL): CURRENT_FUNCTION: NO ASSISTANCE NEEDED

## 2023-11-10 ASSESSMENT — ENCOUNTER SYMPTOMS
FREQUENCY: 0
JOINT SWELLING: 0
SORE THROAT: 0
CHILLS: 0
ABDOMINAL PAIN: 0
HEMATURIA: 0
NERVOUS/ANXIOUS: 1
PARESTHESIAS: 0
COUGH: 0
DYSURIA: 0
CONSTIPATION: 0
PALPITATIONS: 0
HEADACHES: 0
BREAST MASS: 0
DIZZINESS: 0
FEVER: 0
HEMATOCHEZIA: 0
SHORTNESS OF BREATH: 0
ARTHRALGIAS: 0
NAUSEA: 0
HEARTBURN: 0
DIARRHEA: 0
MYALGIAS: 0

## 2023-11-17 ENCOUNTER — OFFICE VISIT (OUTPATIENT)
Dept: INTERNAL MEDICINE | Facility: CLINIC | Age: 78
End: 2023-11-17
Payer: COMMERCIAL

## 2023-11-17 ENCOUNTER — LAB (OUTPATIENT)
Dept: INTERNAL MEDICINE | Facility: CLINIC | Age: 78
End: 2023-11-17

## 2023-11-17 VITALS
TEMPERATURE: 98.4 F | SYSTOLIC BLOOD PRESSURE: 138 MMHG | OXYGEN SATURATION: 98 % | BODY MASS INDEX: 23.6 KG/M2 | WEIGHT: 125 LBS | HEART RATE: 60 BPM | DIASTOLIC BLOOD PRESSURE: 66 MMHG | HEIGHT: 61 IN | RESPIRATION RATE: 16 BRPM

## 2023-11-17 DIAGNOSIS — I10 ESSENTIAL HYPERTENSION: ICD-10-CM

## 2023-11-17 DIAGNOSIS — Z78.0 POST-MENOPAUSE: ICD-10-CM

## 2023-11-17 DIAGNOSIS — Z23 NEED FOR PROPHYLACTIC VACCINATION AND INOCULATION AGAINST INFLUENZA: ICD-10-CM

## 2023-11-17 DIAGNOSIS — E78.2 MIXED HYPERLIPIDEMIA: ICD-10-CM

## 2023-11-17 DIAGNOSIS — Z23 HIGH PRIORITY FOR 2019-NCOV VACCINE: ICD-10-CM

## 2023-11-17 DIAGNOSIS — Z12.11 COLON CANCER SCREENING: ICD-10-CM

## 2023-11-17 DIAGNOSIS — F43.23 ADJUSTMENT DISORDER WITH MIXED ANXIETY AND DEPRESSED MOOD: ICD-10-CM

## 2023-11-17 DIAGNOSIS — Z00.00 ENCOUNTER FOR MEDICARE ANNUAL WELLNESS EXAM: Primary | ICD-10-CM

## 2023-11-17 DIAGNOSIS — K21.9 GASTROESOPHAGEAL REFLUX DISEASE WITHOUT ESOPHAGITIS: ICD-10-CM

## 2023-11-17 DIAGNOSIS — Z12.31 ENCOUNTER FOR SCREENING MAMMOGRAM FOR BREAST CANCER: ICD-10-CM

## 2023-11-17 LAB
ALBUMIN SERPL BCG-MCNC: 4.8 G/DL (ref 3.5–5.2)
ALP SERPL-CCNC: 84 U/L (ref 40–150)
ALT SERPL W P-5'-P-CCNC: 22 U/L (ref 0–50)
ANION GAP SERPL CALCULATED.3IONS-SCNC: 11 MMOL/L (ref 7–15)
AST SERPL W P-5'-P-CCNC: 25 U/L (ref 0–45)
BILIRUB SERPL-MCNC: 0.8 MG/DL
BUN SERPL-MCNC: 11.5 MG/DL (ref 8–23)
CALCIUM SERPL-MCNC: 9.8 MG/DL (ref 8.8–10.2)
CHLORIDE SERPL-SCNC: 100 MMOL/L (ref 98–107)
CHOLEST SERPL-MCNC: 173 MG/DL
CREAT SERPL-MCNC: 0.77 MG/DL (ref 0.51–0.95)
CREAT UR-MCNC: 155.4 MG/DL
DEPRECATED HCO3 PLAS-SCNC: 26 MMOL/L (ref 22–29)
EGFRCR SERPLBLD CKD-EPI 2021: 79 ML/MIN/1.73M2
GLUCOSE SERPL-MCNC: 110 MG/DL (ref 70–99)
HDLC SERPL-MCNC: 74 MG/DL
LDLC SERPL CALC-MCNC: 83 MG/DL
MICROALBUMIN UR-MCNC: 19.4 MG/L
MICROALBUMIN/CREAT UR: 12.48 MG/G CR (ref 0–25)
NONHDLC SERPL-MCNC: 99 MG/DL
POTASSIUM SERPL-SCNC: 4.2 MMOL/L (ref 3.4–5.3)
PROT SERPL-MCNC: 7.5 G/DL (ref 6.4–8.3)
SODIUM SERPL-SCNC: 137 MMOL/L (ref 135–145)
TRIGL SERPL-MCNC: 79 MG/DL

## 2023-11-17 PROCEDURE — G0439 PPPS, SUBSEQ VISIT: HCPCS | Performed by: INTERNAL MEDICINE

## 2023-11-17 PROCEDURE — 99214 OFFICE O/P EST MOD 30 MIN: CPT | Mod: 25 | Performed by: INTERNAL MEDICINE

## 2023-11-17 PROCEDURE — 91320 SARSCV2 VAC 30MCG TRS-SUC IM: CPT | Performed by: INTERNAL MEDICINE

## 2023-11-17 PROCEDURE — 90662 IIV NO PRSV INCREASED AG IM: CPT | Performed by: INTERNAL MEDICINE

## 2023-11-17 PROCEDURE — G0008 ADMIN INFLUENZA VIRUS VAC: HCPCS | Performed by: INTERNAL MEDICINE

## 2023-11-17 PROCEDURE — 80061 LIPID PANEL: CPT | Performed by: INTERNAL MEDICINE

## 2023-11-17 PROCEDURE — 82043 UR ALBUMIN QUANTITATIVE: CPT | Performed by: INTERNAL MEDICINE

## 2023-11-17 PROCEDURE — 80053 COMPREHEN METABOLIC PANEL: CPT | Performed by: INTERNAL MEDICINE

## 2023-11-17 PROCEDURE — 82570 ASSAY OF URINE CREATININE: CPT | Performed by: INTERNAL MEDICINE

## 2023-11-17 PROCEDURE — 90480 ADMN SARSCOV2 VAC 1/ONLY CMP: CPT | Performed by: INTERNAL MEDICINE

## 2023-11-17 PROCEDURE — 36415 COLL VENOUS BLD VENIPUNCTURE: CPT | Performed by: INTERNAL MEDICINE

## 2023-11-17 RX ORDER — PANTOPRAZOLE SODIUM 40 MG/1
40 TABLET, DELAYED RELEASE ORAL DAILY
Qty: 90 TABLET | Refills: 3 | Status: SHIPPED | OUTPATIENT
Start: 2023-11-17

## 2023-11-17 RX ORDER — METOPROLOL SUCCINATE 50 MG/1
50 TABLET, EXTENDED RELEASE ORAL DAILY
Qty: 90 TABLET | Refills: 3 | Status: SHIPPED | OUTPATIENT
Start: 2023-11-17

## 2023-11-17 RX ORDER — ATORVASTATIN CALCIUM 20 MG/1
20 TABLET, FILM COATED ORAL DAILY
Qty: 90 TABLET | Refills: 3 | Status: SHIPPED | OUTPATIENT
Start: 2023-11-17

## 2023-11-17 RX ORDER — CITALOPRAM HYDROBROMIDE 20 MG/1
20 TABLET ORAL DAILY
Qty: 90 TABLET | Refills: 3 | Status: SHIPPED | OUTPATIENT
Start: 2023-11-17

## 2023-11-17 RX ORDER — AMLODIPINE BESYLATE 5 MG/1
10 TABLET ORAL DAILY
Qty: 180 TABLET | Refills: 3 | Status: SHIPPED | OUTPATIENT
Start: 2023-11-17

## 2023-11-17 ASSESSMENT — ENCOUNTER SYMPTOMS
HEARTBURN: 0
PALPITATIONS: 0
JOINT SWELLING: 0
CONSTIPATION: 0
DIARRHEA: 0
MYALGIAS: 0
DYSURIA: 0
SORE THROAT: 0
NERVOUS/ANXIOUS: 1
CHILLS: 0
HEMATOCHEZIA: 0
SHORTNESS OF BREATH: 0
HEADACHES: 0
DIZZINESS: 0
PARESTHESIAS: 0
HEMATURIA: 0
ARTHRALGIAS: 0
BREAST MASS: 0
COUGH: 0
ABDOMINAL PAIN: 0
NAUSEA: 0
FREQUENCY: 0
FEVER: 0

## 2023-11-17 ASSESSMENT — PATIENT HEALTH QUESTIONNAIRE - PHQ9
SUM OF ALL RESPONSES TO PHQ QUESTIONS 1-9: 8
10. IF YOU CHECKED OFF ANY PROBLEMS, HOW DIFFICULT HAVE THESE PROBLEMS MADE IT FOR YOU TO DO YOUR WORK, TAKE CARE OF THINGS AT HOME, OR GET ALONG WITH OTHER PEOPLE: NOT DIFFICULT AT ALL
SUM OF ALL RESPONSES TO PHQ QUESTIONS 1-9: 8

## 2023-11-17 ASSESSMENT — ACTIVITIES OF DAILY LIVING (ADL): CURRENT_FUNCTION: NO ASSISTANCE NEEDED

## 2023-11-17 NOTE — PROGRESS NOTES
"SUBJECTIVE:   Jordyn is a 78 year old, presenting for the following:  Physical        11/17/2023     3:05 PM   Additional Questions   Roomed by Mariangel Corrigan       Are you in the first 12 months of your Medicare coverage?  No    Healthy Habits:     In general, how would you rate your overall health?  Good    Frequency of exercise:  2-3 days/week    Duration of exercise:  15-30 minutes    Do you usually eat at least 4 servings of fruit and vegetables a day, include whole grains    & fiber and avoid regularly eating high fat or \"junk\" foods?  No    Taking medications regularly:  Yes    Medication side effects:  None    Ability to successfully perform activities of daily living:  No assistance needed    Home Safety:  No safety concerns identified    Hearing Impairment:  No hearing concerns    In the past 6 months, have you been bothered by leaking of urine?  No    In general, how would you rate your overall mental or emotional health?  Poor    Additional concerns today:  No    Feeling good. Busy all the time.     Foot infection is better,     Will do cologuard testing.      Doing ok with medications. No side effects.      Living alone, not eating too much. Losing some weight.       Today's PHQ-9 Score:       11/17/2023     2:57 PM   PHQ-9 SCORE   PHQ-9 Total Score MyChart 8 (Mild depression)   PHQ-9 Total Score 8           Have you ever done Advance Care Planning? (For example, a Health Directive, POLST, or a discussion with a medical provider or your loved ones about your wishes): Yes, advance care planning is on file.       Fall risk  Fallen 2 or more times in the past year?: Yes  Any fall with injury in the past year?: No    Cognitive Screening   1) Repeat 3 items (Leader, Season, Table)    2) Clock draw: NORMAL  3) 3 item recall: Recalls 3 objects  Results: 3 items recalled: COGNITIVE IMPAIRMENT LESS LIKELY    Mini-CogTM Copyright S Leobardo. Licensed by the author for use in [facility]; reprinted " with permission (jose@[facility]). All rights reserved.      Do you have sleep apnea, excessive snoring or daytime drowsiness? : no    Reviewed and updated as needed this visit by clinical staff   Tobacco  Allergies  Meds              Reviewed and updated as needed this visit by Provider                 Social History     Tobacco Use     Smoking status: Never     Smokeless tobacco: Never   Substance Use Topics     Alcohol use: No     Alcohol/week: 0.0 standard drinks of alcohol         11/10/2023     8:50 AM   Alcohol Use   Prescreen: >3 drinks/day or >7 drinks/week? Not Applicable     Do you have a current opioid prescription? No  Do you use any other controlled substances or medications that are not prescribed by a provider? None      Current providers sharing in care for this patient include:   Patient Care Team:  Joe Geller MD as PCP - General (Internal Medicine)  Joe Geller MD as Assigned PCP    The following health maintenance items are reviewed in Epic and correct as of today:  Health Maintenance   Topic Date Due     DEXA  Never done     ANNUAL REVIEW OF HM ORDERS  Never done     ZOSTER IMMUNIZATION (1 of 2) Never done     RSV VACCINE (Pregnancy & 60+) (1 - 1-dose 60+ series) Never done     INFLUENZA VACCINE (1) 09/01/2023     COVID-19 Vaccine (5 - 2023-24 season) 09/01/2023     MEDICARE ANNUAL WELLNESS VISIT  10/18/2023     FALL RISK ASSESSMENT  11/17/2024     LIPID  10/18/2027     ADVANCE CARE PLANNING  10/18/2027     DTAP/TDAP/TD IMMUNIZATION (2 - Td or Tdap) 10/08/2030     HEPATITIS C SCREENING  Completed     PHQ-2 (once per calendar year)  Completed     Pneumococcal Vaccine: 65+ Years  Completed     IPV IMMUNIZATION  Aged Out     HPV IMMUNIZATION  Aged Out     MENINGITIS IMMUNIZATION  Aged Out     RSV MONOCLONAL ANTIBODY  Aged Out     MAMMO SCREENING  Discontinued     COLORECTAL CANCER SCREENING  Discontinued     Lab work is in process      Mammogram Screening - Patient over age 75, has elected  "to continue with screening.  Pertinent mammograms are reviewed under the imaging tab.    Review of Systems   Constitutional:  Negative for chills and fever.   HENT:  Negative for congestion, ear pain, hearing loss and sore throat.    Eyes:  Negative for visual disturbance.   Respiratory:  Negative for cough and shortness of breath.    Cardiovascular:  Negative for chest pain, palpitations and peripheral edema.   Gastrointestinal:  Negative for abdominal pain, constipation, diarrhea, heartburn, hematochezia and nausea.   Breasts:  Negative for tenderness, breast mass and discharge.   Genitourinary:  Negative for dysuria, frequency, genital sores, hematuria, pelvic pain, urgency, vaginal bleeding and vaginal discharge.   Musculoskeletal:  Negative for arthralgias, joint swelling and myalgias.   Skin:  Negative for rash.   Neurological:  Negative for dizziness, headaches and paresthesias.   Psychiatric/Behavioral:  Negative for mood changes. The patient is nervous/anxious.          OBJECTIVE:   /66   Pulse 60   Temp 98.4  F (36.9  C) (Temporal)   Resp 16   Ht 1.537 m (5' 0.5\")   Wt 56.7 kg (125 lb)   SpO2 98%   BMI 24.01 kg/m   Estimated body mass index is 24.01 kg/m  as calculated from the following:    Height as of this encounter: 1.537 m (5' 0.5\").    Weight as of this encounter: 56.7 kg (125 lb).  Physical Exam  GENERAL: healthy, alert and no distress  EYES: Eyes grossly normal to inspection, PERRL and conjunctivae and sclerae normal  HENT: ear canals and TM's normal, nose and mouth without ulcers or lesions  NECK: no adenopathy, no asymmetry, masses, or scars and thyroid normal to palpation  RESP: lungs clear to auscultation - no rales, rhonchi or wheezes  CV: regular rate and rhythm, normal S1 S2, no S3 or S4, no murmur, click or rub, no peripheral edema and peripheral pulses strong  ABDOMEN: soft, nontender, no hepatosplenomegaly, no masses and bowel sounds normal  MS: no gross musculoskeletal " defects noted, no edema  SKIN: no suspicious lesions or rashes  NEURO: Normal strength and tone, mentation intact and speech normal  PSYCH: mentation appears normal, affect normal/bright        ASSESSMENT / PLAN:       ICD-10-CM    1. Encounter for Medicare annual wellness exam  Z00.00       2. Encounter for screening mammogram for breast cancer  Z12.31 *MA Screening Digital Bilateral      3. Colon cancer screening  Z12.11 COLOGUARD(EXACT SCIENCES)      4. Essential hypertension  I10 amLODIPine (NORVASC) 5 MG tablet     metoprolol succinate ER (TOPROL XL) 50 MG 24 hr tablet     Comprehensive metabolic panel (BMP + Alb, Alk Phos, ALT, AST, Total. Bili, TP)     Albumin Random Urine Quantitative with Creat Ratio      5. Mixed hyperlipidemia  E78.2 atorvastatin (LIPITOR) 20 MG tablet     Comprehensive metabolic panel (BMP + Alb, Alk Phos, ALT, AST, Total. Bili, TP)     Lipid panel reflex to direct LDL Fasting      6. Adjustment disorder with mixed anxiety and depressed mood  F43.23 citalopram (CELEXA) 20 MG tablet      7. Gastroesophageal reflux disease without esophagitis  K21.9 pantoprazole (PROTONIX) 40 MG EC tablet      8. Post-menopause  Z78.0 DX Hip/Pelvis/Spine            Medicare wellness exam, patient is doing well overall.  Fell on the ice but no injuries.  Memory is good.  Lives on her own continues to drive.  We will get a COVID and flu shot today.    Other issues depression is chronic she is on citalopram we will continue it does not feel suicidal despite her PHQ-9 score.  Does not want any other treatment.    Hypertension continue amlodipine metoprolol doing okay check her labs  Hyperlipidemia continue atorvastatin I will check labs.  Reflux disease we will continue Protonix.    We will do screening for bone density and continue mammogram screening.        Patient has been advised of split billing requirements and indicates understanding: Yes    Depression Screening Follow Up        11/17/2023     2:57 PM    PHQ   PHQ-9 Total Score 8   Q9: Thoughts of better off dead/self-harm past 2 weeks More than half the days   F/U: Thoughts of suicide or self-harm No   F/U: Safety concerns No     Denies thoughts of suicide.    Does not want any counseling or medication adjustment.  D        Follow Up    Follow Up Actions Taken  Patient declined referral.    Discussed the following ways the patient can remain in a safe environment:  be around others    COUNSELING:  Reviewed preventive health counseling, as reflected in patient instructions       Regular exercise       Healthy diet/nutrition       Immunizations  Vaccinated for: Covid-19 and Influenza          She reports that she has never smoked. She has never used smokeless tobacco.      Appropriate preventive services were discussed with this patient, including applicable screening as appropriate for fall prevention, nutrition, physical activity, Tobacco-use cessation, weight loss and cognition.  Checklist reviewing preventive services available has been given to the patient.    Reviewed patients plan of care and provided an AVS. The Basic Care Plan (routine screening as documented in Health Maintenance) for Jordyn meets the Care Plan requirement. This Care Plan has been established and reviewed with the Patient.        Joe Geller MD  [facility]    Identified Health Risks:  The patient was counseled and encouraged to consider modifying their diet and eating habits. She was provided with information on recommended healthy diet options.  The patient was provided with suggestions to help her develop a healthy emotional lifestyle.  The patient's PHQ-9 score is consistent with mild depression. She was provided with information regarding depression and was advised to schedule a follow up appointment in 53 weeks to further address this issue.

## 2023-11-17 NOTE — PATIENT INSTRUCTIONS
Patient Education   Personalized Prevention Plan  You are due for the preventive services outlined below.  Your care team is available to assist you in scheduling these services.  If you have already completed any of these items, please share that information with your care team to update in your medical record.  Health Maintenance Due   Topic Date Due     Osteoporosis Screening  Never done     ANNUAL REVIEW OF HM ORDERS  Never done     Zoster (Shingles) Vaccine (1 of 2) Never done     RSV VACCINE (Pregnancy & 60+) (1 - 1-dose 60+ series) Never done     Flu Vaccine (1) 09/01/2023     COVID-19 Vaccine (5 - 2023-24 season) 09/01/2023     Learning About Dietary Guidelines  What are the Dietary Guidelines for Americans?     Dietary Guidelines for Americans provide tips for eating well and staying healthy. This helps reduce the risk for long-term (chronic) diseases.  These guidelines recommend that you:  Eat and drink the right amount for you. The U.[place] government's food guide is called MyPlate. It can help you make your own well-balanced eating plan.  Try to balance your eating with your activity. This helps you stay at a healthy weight.  Drink alcohol in moderation, if at all.  Limit foods high in salt, saturated fat, trans fat, and added sugar.  These guidelines are from the U.S. Department of Agriculture and the U.S. Department of Health and Human Services. They are updated every 5 years.  What is MyPlate?  MyPlate is the U.[place]. government's food guide. It can help you make your own well-balanced eating plan. A balanced eating plan means that you eat enough, but not too much, and that your food gives you the nutrients you need to stay healthy.  MyPlate focuses on eating plenty of whole grains, fruits, and vegetables, and on limiting fat and sugar. It is available online at www.ChooseMyPlate.gov.  How can you get started?  If you're trying to eat healthier, you can slowly change your eating habits over time. You don't  "have to make big changes all at once. Start by adding one or two healthy foods to your meals each day.  Grains  Choose whole-grain breads and cereals and whole-wheat pasta and whole-grain crackers.  Vegetables  Eat a variety of vegetables every day. They have lots of nutrients and are part of a heart-healthy diet.  Fruits  Eat a variety of fruits every day. Fruits contain lots of nutrients. Choose fresh fruit instead of fruit juice.  Protein foods  Choose fish and lean poultry more often. Eat red meat and fried meats less often. Dried beans, tofu, and nuts are also good sources of protein.  Dairy  Choose low-fat or fat-free products from this food group. If you have problems digesting milk, try eating cheese or yogurt instead.  Fats and oils  Limit fats and oils if you're trying to cut calories. Choose healthy fats when you cook. These include canola oil and olive oil.  Where can you learn more?  Go to https://www.[employer].net/patiented  Enter D676 in the search box to learn more about \"Learning About Dietary Guidelines.\"  Current as of: February 28, 2023               Content Version: 13.8    8763-1890 [employer].   Care instructions adapted under license by your healthcare professional. If you have questions about a medical condition or this instruction, always ask your healthcare professional. [employer] disclaims any warranty or liability for your use of this information.      Your Health Risk Assessment indicates you feel you are not in good emotional health.    Recreation   Recreation is not limited to sports and team events. It includes any activity that provides relaxation, interest, enjoyment, and exercise. Recreation provides an outlet for physical, mental, and social energy. It can give a sense of worth and achievement. It can help you stay healthy.    Mental Exercise and Social Involvement  Mental and emotional health is as important as physical health. Keep in touch with " friends and family. Stay as active as possible. Continue to learn and challenge yourself.   Things you can do to stay mentally active are:  Learn something new, like a foreign language or musical instrument.   Play SCRABBLE or do crossword puzzles. If you cannot find people to play these games with you at home, you can play them with others on your computer through the Internet.   Join a games club--anything from card games to chess or checkers or lawn bowling.   Start a new hobby.   Go back to school.   Volunteer.   Read.   Keep up with world events.  Learning About Depression Screening  What is depression screening?  Depression screening is a way to see if you have depression symptoms. It may be done by a doctor or counselor. It's often part of a routine checkup. That's because your mental health is just as important as your physical health.  Depression is a mental health condition that affects how you feel, think, and act. You may:  Have less energy.  Lose interest in your daily activities.  Feel sad and grouchy for a long time.  Depression is very common. It affects people of all ages.  Many things can lead to depression. Some people become depressed after they have a stroke or find out they have a major illness like cancer or heart disease. The death of a loved one or a breakup may lead to depression. It can run in families. Most experts believe that a combination of inherited genes and stressful life events can cause it.  What happens during screening?  You may be asked to fill out a form about your depression symptoms. You and the doctor will discuss your answers. The doctor may ask you more questions to learn more about how you think, act, and feel.  What happens after screening?  If you have symptoms of depression, your doctor will talk to you about your options.  Doctors usually treat depression with medicines or counseling. Often, combining the two works best. Many people don't get help because they think  "that they'll get over the depression on their own. But people with depression may not get better unless they get treatment.  The cause of depression is not well understood. There may be many factors involved. But if you have depression, it's not your fault.  A serious symptom of depression is thinking about death or suicide. If you or someone you care about talks about this or about feeling hopeless, get help right away.  It's important to know that depression can be treated. Medicine, counseling, and self-care may help.  Where can you learn more?  Go to https://www.[employer].net/patiented  Enter T185 in the search box to learn more about \"Learning About Depression Screening.\"  Current as of: June 25, 2023               Content Version: 13.8    5570-5616 [employer].   Care instructions adapted under license by your healthcare professional. If you have questions about a medical condition or this instruction, always ask your healthcare professional. [employer] disclaims any warranty or liability for your use of this information.         "

## 2023-12-15 LAB — NONINV COLON CA DNA+OCC BLD SCRN STL QL: NEGATIVE

## 2024-09-11 ENCOUNTER — HOSPITAL ENCOUNTER (OUTPATIENT)
Dept: MAMMOGRAPHY | Facility: CLINIC | Age: 79
Discharge: HOME | End: 2024-09-11
Attending: INTERNAL MEDICINE
Payer: COMMERCIAL

## 2024-09-11 ENCOUNTER — HOSPITAL ENCOUNTER (OUTPATIENT)
Dept: BONE DENSITY | Facility: CLINIC | Age: 79
Discharge: HOME | End: 2024-09-11
Attending: INTERNAL MEDICINE
Payer: COMMERCIAL

## 2024-09-11 DIAGNOSIS — Z12.31 ENCOUNTER FOR SCREENING MAMMOGRAM FOR BREAST CANCER: ICD-10-CM

## 2024-09-11 DIAGNOSIS — Z78.0 POST-MENOPAUSE: ICD-10-CM

## 2024-09-11 PROCEDURE — 77080 DXA BONE DENSITY AXIAL: CPT

## 2024-09-11 PROCEDURE — 77063 BREAST TOMOSYNTHESIS BI: CPT

## 2024-10-31 ENCOUNTER — MYC MEDICAL ADVICE (OUTPATIENT)
Dept: INTERNAL MEDICINE | Facility: CLINIC | Age: 79
End: 2024-10-31
Payer: COMMERCIAL

## 2024-10-31 DIAGNOSIS — K21.9 GASTROESOPHAGEAL REFLUX DISEASE WITHOUT ESOPHAGITIS: ICD-10-CM

## 2024-10-31 DIAGNOSIS — E78.2 MIXED HYPERLIPIDEMIA: ICD-10-CM

## 2024-10-31 DIAGNOSIS — I10 ESSENTIAL HYPERTENSION: ICD-10-CM

## 2024-10-31 DIAGNOSIS — F43.23 ADJUSTMENT DISORDER WITH MIXED ANXIETY AND DEPRESSED MOOD: ICD-10-CM

## 2024-11-01 RX ORDER — CITALOPRAM HYDROBROMIDE 20 MG/1
20 TABLET ORAL DAILY
Qty: 90 TABLET | Refills: 3 | Status: SHIPPED | OUTPATIENT
Start: 2024-11-01

## 2024-11-01 RX ORDER — PANTOPRAZOLE SODIUM 40 MG/1
40 TABLET, DELAYED RELEASE ORAL DAILY
Qty: 90 TABLET | Refills: 0 | Status: SHIPPED | OUTPATIENT
Start: 2024-11-01

## 2024-11-01 RX ORDER — ATORVASTATIN CALCIUM 20 MG/1
20 TABLET, FILM COATED ORAL DAILY
Qty: 90 TABLET | Refills: 0 | Status: SHIPPED | OUTPATIENT
Start: 2024-11-01

## 2024-11-01 RX ORDER — METOPROLOL SUCCINATE 50 MG/1
50 TABLET, EXTENDED RELEASE ORAL DAILY
Qty: 90 TABLET | Refills: 0 | Status: SHIPPED | OUTPATIENT
Start: 2024-11-01

## 2024-11-01 RX ORDER — AMLODIPINE BESYLATE 5 MG/1
10 TABLET ORAL DAILY
Qty: 180 TABLET | Refills: 0 | Status: SHIPPED | OUTPATIENT
Start: 2024-11-01

## 2024-12-28 ENCOUNTER — MYC MEDICAL ADVICE (OUTPATIENT)
Dept: INTERNAL MEDICINE | Facility: CLINIC | Age: 79
End: 2024-12-28
Payer: COMMERCIAL

## 2024-12-30 NOTE — TELEPHONE ENCOUNTER
Patient returned [employer] message:   The only medication that were filled on 11/24 were Amlodipine and Pantoprazole   I have them but   the  Citalopram, Metoprolol, Atorvastatin  were not filled Those are one ones I am out of I am concerned because my Blood pressure is elevated  153/64     Upon chart review, all medications were ordered for fill in November.    Writer called [facility] pharmacy. They do have medications on file for fill. They will fill now for patient.     Sent [employer] message to patient on update.    Charito Salazar RN on 12/30/2024 at 10:09 AM

## 2024-12-30 NOTE — TELEPHONE ENCOUNTER
Patient sent in [facility]:  I am completely out of my medications and my request for refill was denied. My wellness check is on  January 6th I have been out for about a week I would like to make another request for refills in case the weather and road conditions are bad and I cant make my appointment. Please advise     Last refill was 11/1/24 for three-month supply. Patient has wellness appointment 1/6/25. Patient requesting refill prior to apt. Sent patient [facility] to clarify if she is completely out of medications.    Charito Salazar RN on 12/30/2024 at 7:29 AM

## 2025-01-01 SDOH — HEALTH STABILITY: PHYSICAL HEALTH: ON AVERAGE, HOW MANY MINUTES DO YOU ENGAGE IN EXERCISE AT THIS LEVEL?: 40 MIN

## 2025-01-01 SDOH — HEALTH STABILITY: PHYSICAL HEALTH: ON AVERAGE, HOW MANY DAYS PER WEEK DO YOU ENGAGE IN MODERATE TO STRENUOUS EXERCISE (LIKE A BRISK WALK)?: 5 DAYS

## 2025-01-01 ASSESSMENT — SOCIAL DETERMINANTS OF HEALTH (SDOH): HOW OFTEN DO YOU GET TOGETHER WITH FRIENDS OR RELATIVES?: NEVER

## 2025-01-06 ENCOUNTER — OFFICE VISIT (OUTPATIENT)
Dept: INTERNAL MEDICINE | Facility: CLINIC | Age: 80
End: 2025-01-06
Attending: INTERNAL MEDICINE
Payer: COMMERCIAL

## 2025-01-06 VITALS
BODY MASS INDEX: 24.55 KG/M2 | HEART RATE: 73 BPM | TEMPERATURE: 97.5 F | RESPIRATION RATE: 16 BRPM | WEIGHT: 130 LBS | DIASTOLIC BLOOD PRESSURE: 60 MMHG | HEIGHT: 61 IN | OXYGEN SATURATION: 98 % | SYSTOLIC BLOOD PRESSURE: 138 MMHG

## 2025-01-06 DIAGNOSIS — I10 ESSENTIAL HYPERTENSION: ICD-10-CM

## 2025-01-06 DIAGNOSIS — F43.23 ADJUSTMENT DISORDER WITH MIXED ANXIETY AND DEPRESSED MOOD: ICD-10-CM

## 2025-01-06 DIAGNOSIS — E78.2 MIXED HYPERLIPIDEMIA: ICD-10-CM

## 2025-01-06 DIAGNOSIS — K21.9 GASTROESOPHAGEAL REFLUX DISEASE WITHOUT ESOPHAGITIS: ICD-10-CM

## 2025-01-06 DIAGNOSIS — Z00.00 ENCOUNTER FOR MEDICARE ANNUAL WELLNESS EXAM: Primary | ICD-10-CM

## 2025-01-06 LAB
ALBUMIN SERPL BCG-MCNC: 4.9 G/DL (ref 3.5–5.2)
ALP SERPL-CCNC: 73 U/L (ref 40–150)
ALT SERPL W P-5'-P-CCNC: 23 U/L (ref 0–50)
ANION GAP SERPL CALCULATED.3IONS-SCNC: 13 MMOL/L (ref 7–15)
AST SERPL W P-5'-P-CCNC: 23 U/L (ref 0–45)
BILIRUB SERPL-MCNC: 0.9 MG/DL
BUN SERPL-MCNC: 14.2 MG/DL (ref 8–23)
CALCIUM SERPL-MCNC: 9.6 MG/DL (ref 8.8–10.4)
CHLORIDE SERPL-SCNC: 101 MMOL/L (ref 98–107)
CHOLEST SERPL-MCNC: 170 MG/DL
CREAT SERPL-MCNC: 0.76 MG/DL (ref 0.51–0.95)
EGFRCR SERPLBLD CKD-EPI 2021: 79 ML/MIN/1.73M2
FASTING STATUS PATIENT QL REPORTED: YES
FASTING STATUS PATIENT QL REPORTED: YES
GLUCOSE SERPL-MCNC: 109 MG/DL (ref 70–99)
HCO3 SERPL-SCNC: 23 MMOL/L (ref 22–29)
HDLC SERPL-MCNC: 78 MG/DL
LDLC SERPL CALC-MCNC: 73 MG/DL
NONHDLC SERPL-MCNC: 92 MG/DL
POTASSIUM SERPL-SCNC: 4.1 MMOL/L (ref 3.4–5.3)
PROT SERPL-MCNC: 7.5 G/DL (ref 6.4–8.3)
SODIUM SERPL-SCNC: 137 MMOL/L (ref 135–145)
TRIGL SERPL-MCNC: 94 MG/DL

## 2025-01-06 PROCEDURE — 99214 OFFICE O/P EST MOD 30 MIN: CPT | Mod: 25 | Performed by: INTERNAL MEDICINE

## 2025-01-06 PROCEDURE — 36415 COLL VENOUS BLD VENIPUNCTURE: CPT | Performed by: INTERNAL MEDICINE

## 2025-01-06 PROCEDURE — 80053 COMPREHEN METABOLIC PANEL: CPT | Performed by: INTERNAL MEDICINE

## 2025-01-06 PROCEDURE — 99397 PER PM REEVAL EST PAT 65+ YR: CPT | Performed by: INTERNAL MEDICINE

## 2025-01-06 PROCEDURE — 80061 LIPID PANEL: CPT | Performed by: INTERNAL MEDICINE

## 2025-01-06 RX ORDER — PANTOPRAZOLE SODIUM 40 MG/1
40 TABLET, DELAYED RELEASE ORAL DAILY
Qty: 90 TABLET | Refills: 4 | Status: SHIPPED | OUTPATIENT
Start: 2025-01-06

## 2025-01-06 RX ORDER — METOPROLOL SUCCINATE 50 MG/1
50 TABLET, EXTENDED RELEASE ORAL DAILY
Qty: 90 TABLET | Refills: 0 | Status: SHIPPED | OUTPATIENT
Start: 2025-01-06

## 2025-01-06 RX ORDER — ATORVASTATIN CALCIUM 20 MG/1
20 TABLET, FILM COATED ORAL DAILY
Qty: 90 TABLET | Refills: 4 | Status: SHIPPED | OUTPATIENT
Start: 2025-01-06

## 2025-01-06 RX ORDER — CITALOPRAM HYDROBROMIDE 20 MG/1
20 TABLET ORAL DAILY
Qty: 90 TABLET | Refills: 4 | Status: SHIPPED | OUTPATIENT
Start: 2025-01-06

## 2025-01-06 RX ORDER — AMLODIPINE BESYLATE 5 MG/1
10 TABLET ORAL DAILY
Qty: 180 TABLET | Refills: 4 | Status: SHIPPED | OUTPATIENT
Start: 2025-01-06

## 2025-01-06 ASSESSMENT — PAIN SCALES - GENERAL: PAINLEVEL_OUTOF10: NO PAIN (0)

## 2025-01-06 NOTE — PROGRESS NOTES
Preventive Care Visit  [facility]  Joe Geller MD, Internal Medicine  Jan 6, 2025      Assessment & Plan   Problem List Items Addressed This Visit       Gastroesophageal reflux disease without esophagitis    Relevant Medications    pantoprazole (PROTONIX) 40 MG EC tablet    Essential hypertension    Relevant Medications    amLODIPine (NORVASC) 5 MG tablet    metoprolol succinate ER (TOPROL XL) 50 MG 24 hr tablet    Other Relevant Orders    Comprehensive metabolic panel (BMP + Alb, Alk Phos, ALT, AST, Total. Bili, TP)    Mixed hyperlipidemia    Relevant Medications    atorvastatin (LIPITOR) 20 MG tablet    Other Relevant Orders    Lipid panel reflex to direct LDL Non-fasting    Comprehensive metabolic panel (BMP + Alb, Alk Phos, ALT, AST, Total. Bili, TP)     Other Visit Diagnoses       Encounter for Medicare annual wellness exam    -  Primary    Adjustment disorder with mixed anxiety and depressed mood        Relevant Medications    citalopram (CELEXA) 20 MG tablet           Patient is here for Medicare wellness exam.  She is doing well she lives on her own, she drives she takes care of her place.  No falls.  Memory is good.    Immunizations are good except she can get a shingles vaccine.  Mammogram and Cologuard are up-to-date    Other issues addressed blood pressure is controlled with amlodipine metoprolol we will refill those medications and check her labs today.    Hyperlipidemia continue atorvastatin check a nonfasting lipid panel today.    GERD her abdominal pain is good with Protonix and will continue it.    Depression she continues to be depressed she does not get out very much encouraged her to go to Hinduism or get out and see people but she does not want to do this will continue the citalopram for now              Patient has been advised of split billing requirements and indicates understanding: Yes       Counseling  Appropriate preventive services were addressed with  this patient via screening, questionnaire, or discussion as appropriate for fall prevention, nutrition, physical activity, Tobacco-use cessation, social engagement, weight loss and cognition.  Checklist reviewing preventive services available has been given to the patient.  Reviewed patient's diet, addressing concerns and/or questions.   Patient is at risk for social isolation and has been provided with information about the benefit of social connection.   The patient was instructed to see the dentist every 6 months.   She is at risk for psychosocial distress and has been provided with information to reduce risk.   The patient was provided with written information regarding signs of hearing loss.     FUTURE APPOINTMENTS:       - Follow-up for annual visit or as needed      Subjective   Jordyn is a 79 year old, presenting for the following:  Physical        1/6/2025    12:56 PM   Additional Questions   Roomed by Radha CHEW  Doing ok, was seen last year in November, ran out of medications. Bp was going up so did get her medications.     Doing ok, little depressed not as busy.     Lives alone, cares for her place.  Drives and does her own stuff. Neighbor helps if needed.     Trying to eat better, weight is stable. Down some from a few years ago.     Mammogram was ok,        Health Care Directive  Patient has a Health Care Directive on file  Advance care planning document is on file and is current.      1/1/2025   General Health   How would you rate your overall physical health? (!) FAIR   Feel stress (tense, anxious, or unable to sleep) To some extent   (!) STRESS CONCERN      1/1/2025   Nutrition   Diet: Regular (no restrictions)         1/1/2025   Exercise   Days per week of moderate/strenous exercise 5 days   Average minutes spent exercising at this level 40 min         1/1/2025   Social Factors   Frequency of gathering with friends or relatives Never   Worry food won't last until get money to buy more No   Food not  last or not have enough money for food? No   Do you have housing? (Housing is defined as stable permanent housing and does not include staying ouside in a car, in a tent, in an abandoned building, in an overnight shelter, or couch-surfing.) Yes   Are you worried about losing your housing? No   Lack of transportation? No   Unable to get utilities (heat,electricity)? Yes   Want help with housing or utility concern? No   (!) FINANCIAL RESOURCE STRAIN CONCERN(!) SOCIAL CONNECTIONS CONCERN      1/1/2025   Fall Risk   Fallen 2 or more times in the past year? No    No   Trouble with walking or balance? No    No       Multiple values from one day are sorted in reverse-chronological order          1/1/2025   Activities of Daily Living- Home Safety   Needs help with the following daily activites None of the above   Safety concerns in the home None of the above         1/1/2025   Dental   Dentist two times every year? (!) NO         1/1/2025   Hearing Screening   Hearing concerns? (!) I FEEL THAT PEOPLE ARE MUMBLING OR NOT SPEAKING CLEARLY.         1/1/2025   Driving Risk Screening   Patient/family members have concerns about driving No         1/1/2025   General Alertness/Fatigue Screening   Have you been more tired than usual lately? No         1/1/2025   Urinary Incontinence Screening   Bothered by leaking urine in past 6 months No         1/1/2025   TB Screening   Were you born outside of the [place]? No         Today's PHQ-2 Score:       1/6/2025     1:06 PM   PHQ-2 ( 1999 [employer])   Q1: Little interest or pleasure in doing things 0   Q2: Feeling down, depressed or hopeless 1   PHQ-2 Score 1    Q1: Little interest or pleasure in doing things Not at all   Q2: Feeling down, depressed or hopeless Several days   PHQ-2 Score 1       Patient-reported           1/1/2025   Substance Use   Alcohol more than 3/day or more than 7/wk No   Do you have a current opioid prescription? No   How severe/bad is pain from 1 to 10? 3/10   Do you use  any other substances recreationally? No     Social History     Tobacco Use    Smoking status: Never    Smokeless tobacco: Never   Vaping Use    Vaping status: Never Used   Substance Use Topics    Alcohol use: No     Alcohol/week: 0.0 standard drinks of alcohol    Drug use: No         9/11/2024   LAST FHS-7 RESULTS   1st degree relative breast or ovarian cancer No   Any relative bilateral breast cancer No   Any male have breast cancer No   Any ONE woman have BOTH breast AND ovarian cancer No   Any woman with breast cancer before 50yrs No   2 or more relatives with breast AND/OR ovarian cancer No   2 or more relatives with breast AND/OR bowel cancer No        Mammogram Screening - Mammogram every 1-2 years updated in Health Maintenance based on mutual decision making    ASCVD Risk   The 10-year ASCVD risk score (Myla DAVID, et al., 2019) is: 37%    Values used to calculate the score:      Age: 79 years      Sex: Female      Is Non- : No      Diabetic: No      Tobacco smoker: No      Systolic Blood Pressure: 138 mmHg      Is BP treated: Yes      HDL Cholesterol: 74 mg/dL      Total Cholesterol: 173 mg/dL            Reviewed and updated as needed this visit by Provider                    Lab work is in process  Current providers sharing in care for this patient include:  Patient Care Team:  Joe Geller MD as PCP - General (Internal Medicine)  Joe Geller MD as Assigned PCP    The following health maintenance items are reviewed in Epic and correct as of today:  Health Maintenance   Topic Date Due    ANNUAL REVIEW OF  ORDERS  Never done    ZOSTER IMMUNIZATION (1 of 2) Never done    RSV VACCINE (1 - 1-dose 75+ series) Never done    MEDICARE ANNUAL WELLNESS VISIT  11/17/2024    BMP  11/17/2024    LIPID  11/17/2024    FALL RISK ASSESSMENT  01/06/2026    GLUCOSE  11/17/2026    ADVANCE CARE PLANNING  11/17/2028    DTAP/TDAP/TD IMMUNIZATION (2 - Td or Tdap) 10/08/2030    DEXA   "09/11/2039    HEPATITIS C SCREENING  Completed    PHQ-2 (once per calendar year)  Completed    INFLUENZA VACCINE  Completed    Pneumococcal Vaccine: 50+ Years  Completed    COVID-19 Vaccine  Completed    HPV IMMUNIZATION  Aged Out    MENINGITIS IMMUNIZATION  Aged Out    RSV MONOCLONAL ANTIBODY  Aged Out    MAMMO SCREENING  Discontinued    COLORECTAL CANCER SCREENING  Discontinued       Review of Systems  CONSTITUTIONAL: NEGATIVE for fever, chills, change in weight  ENT/MOUTH: NEGATIVE for ear, mouth and throat problems  RESP: NEGATIVE for significant cough or SOB  CV: NEGATIVE for chest pain, palpitations or peripheral edema  MUSCULOSKELETAL: NEGATIVE for significant arthralgias or myalgia  NEURO: NEGATIVE for weakness, dizziness or paresthesias     Objective    Exam  /60   Pulse 73   Temp 97.5  F (36.4  C) (Temporal)   Resp 16   Ht 1.549 m (5' 1\")   Wt 59 kg (130 lb)   SpO2 98%   BMI 24.56 kg/m     Estimated body mass index is 24.56 kg/m  as calculated from the following:    Height as of this encounter: 1.549 m (5' 1\").    Weight as of this encounter: 59 kg (130 lb).    Physical Exam  GENERAL: alert and no distress  EYES: Eyes grossly normal to inspection, PERRL and conjunctivae and sclerae normal  HENT: normal cephalic/atraumatic, both ears: occluded with wax, nose and mouth without ulcers or lesions, oropharynx clear, and oral mucous membranes moist  NECK: no adenopathy, no asymmetry, masses, or scars  RESP: lungs clear to auscultation - no rales, rhonchi or wheezes  CV: regular rates and rhythm, normal S1 S2, no S3 or S4, grade 2/6 systolic  murmur heard best over the base  , peripheral pulses strong, and no peripheral edema  ABDOMEN: soft, nontender, no hepatosplenomegaly, no masses and bowel sounds normal  MS: no gross musculoskeletal defects noted, no edema  SKIN: no suspicious lesions or rashes  NEURO: Normal strength and tone, mentation intact and speech normal  PSYCH: mentation appears normal, " affect normal/bright        1/6/2025   Mini Cog   Clock Draw Score 2 Normal   3 Item Recall 2 objects recalled   Mini Cog Total Score 4              Signed Electronically by: Joe Geller MD

## 2025-07-07 DIAGNOSIS — I10 ESSENTIAL HYPERTENSION: ICD-10-CM

## 2025-07-07 RX ORDER — METOPROLOL SUCCINATE 50 MG/1
50 TABLET, EXTENDED RELEASE ORAL DAILY
Qty: 90 TABLET | Refills: 1 | Status: SHIPPED | OUTPATIENT
Start: 2025-07-07

## (undated) DEVICE — GLOVE PROTEGRITY 7.5 LATEX

## (undated) DEVICE — TAPE TRANSPORE 1/2"

## (undated) DEVICE — SOL WATER IRRIG 1000ML BOTTLE 07139-09

## (undated) DEVICE — SOL NACL 0.9% IRRIG 1000ML BOTTLE 07138-09

## (undated) RX ORDER — FENTANYL CITRATE 50 UG/ML
INJECTION, SOLUTION INTRAMUSCULAR; INTRAVENOUS
Status: DISPENSED
Start: 2021-12-02

## (undated) RX ORDER — FENTANYL CITRATE 50 UG/ML
INJECTION, SOLUTION INTRAMUSCULAR; INTRAVENOUS
Status: DISPENSED
Start: 2021-12-16